# Patient Record
Sex: FEMALE | Race: WHITE | NOT HISPANIC OR LATINO | Employment: UNEMPLOYED | URBAN - METROPOLITAN AREA
[De-identification: names, ages, dates, MRNs, and addresses within clinical notes are randomized per-mention and may not be internally consistent; named-entity substitution may affect disease eponyms.]

---

## 2019-02-12 ENCOUNTER — OFFICE VISIT (OUTPATIENT)
Dept: GASTROENTEROLOGY | Facility: CLINIC | Age: 34
End: 2019-02-12

## 2019-02-12 VITALS
HEIGHT: 63 IN | WEIGHT: 188 LBS | SYSTOLIC BLOOD PRESSURE: 118 MMHG | DIASTOLIC BLOOD PRESSURE: 88 MMHG | TEMPERATURE: 97.8 F | BODY MASS INDEX: 33.31 KG/M2

## 2019-02-12 DIAGNOSIS — K58.0 IRRITABLE BOWEL SYNDROME WITH DIARRHEA: Primary | ICD-10-CM

## 2019-02-12 DIAGNOSIS — K21.9 GASTROESOPHAGEAL REFLUX DISEASE WITHOUT ESOPHAGITIS: ICD-10-CM

## 2019-02-12 PROCEDURE — 99203 OFFICE O/P NEW LOW 30 MIN: CPT | Performed by: INTERNAL MEDICINE

## 2019-02-12 RX ORDER — COLESEVELAM 180 1/1
625 TABLET ORAL 2 TIMES DAILY WITH MEALS
Qty: 180 TABLET | Refills: 3 | Status: SHIPPED | OUTPATIENT
Start: 2019-02-12 | End: 2020-04-16 | Stop reason: SDUPTHER

## 2019-02-12 RX ORDER — SERTRALINE HYDROCHLORIDE 100 MG/1
100 TABLET, FILM COATED ORAL DAILY
COMMUNITY
Start: 2019-01-28 | End: 2020-01-28

## 2019-02-12 RX ORDER — CETIRIZINE HYDROCHLORIDE 10 MG/1
10 TABLET ORAL DAILY
COMMUNITY

## 2019-02-12 RX ORDER — LANSOPRAZOLE 30 MG/1
30 CAPSULE, DELAYED RELEASE ORAL DAILY
Qty: 90 CAPSULE | Refills: 3 | Status: SHIPPED | OUTPATIENT
Start: 2019-02-12 | End: 2020-01-22

## 2019-02-12 RX ORDER — LANSOPRAZOLE 30 MG/1
30 CAPSULE, DELAYED RELEASE ORAL DAILY
COMMUNITY
End: 2019-02-12 | Stop reason: SDUPTHER

## 2019-02-12 RX ORDER — METOPROLOL SUCCINATE 50 MG/1
50 TABLET, EXTENDED RELEASE ORAL DAILY
Refills: 3 | COMMUNITY
Start: 2019-01-28

## 2019-02-12 NOTE — PROGRESS NOTES
Chief Complaint   Patient presents with   • Heartburn   • Irritable Bowel Syndrome       Brittany NEWTON is a  33 y.o. female here for a follow up visit for IBS with diarrhea.    HPI     Patient 33-year-old female with history of heartburn and IBS D here for follow-up.  Patient last seen several years ago was doing well on nortriptyline and WelChol as well as Prevacid for her GERD.  Patient reports during pregnancy year and a half ago her GERD symptoms maintain good response on the Prevacid but her IBS symptoms seemed to resolve as she had to come off the nortriptyline during the pregnancy she actually stopped her WelChol with no significant symptoms.  Patient did well for some time after pregnancy but slowly her symptoms have resumed and isn't currently having cramps and diarrhea regularly.  Patient is still breast-feeding so was limited in taking her nortriptyline.  Patient here for further recommendations.    Past Medical History:   Diagnosis Date   • GERD (gastroesophageal reflux disease)    • Hiatal hernia    • Irritable bowel syndrome          Current Outpatient Medications:   •  cetirizine (zyrTEC) 10 MG tablet, Take 10 mg by mouth Daily., Disp: , Rfl:   •  lansoprazole (PREVACID) 30 MG capsule, Take 30 mg by mouth Daily., Disp: , Rfl:   •  metoprolol succinate XL (TOPROL-XL) 50 MG 24 hr tablet, Take 50 mg by mouth Daily., Disp: , Rfl: 3  •  sertraline (ZOLOFT) 100 MG tablet, Take 100 mg by mouth Daily., Disp: , Rfl:   •  colesevelam (WELCHOL) 625 MG tablet, Take 1 tablet by mouth 2 (Two) Times a Day With Meals., Disp: 180 tablet, Rfl: 3    Allergies   Allergen Reactions   • Penicillins Rash       Social History     Socioeconomic History   • Marital status:      Spouse name: Not on file   • Number of children: Not on file   • Years of education: Not on file   • Highest education level: Not on file   Social Needs   • Financial resource strain: Not on file   • Food insecurity - worry: Not on file   •  Food insecurity - inability: Not on file   • Transportation needs - medical: Not on file   • Transportation needs - non-medical: Not on file   Occupational History   • Not on file   Tobacco Use   • Smoking status: Never Smoker   • Smokeless tobacco: Never Used   Substance and Sexual Activity   • Alcohol use: No     Frequency: Never   • Drug use: No   • Sexual activity: Not on file   Other Topics Concern   • Not on file   Social History Narrative   • Not on file       History reviewed. No pertinent family history.    Review of Systems   Constitutional: Negative.    HENT: Negative.    Eyes: Negative.    Respiratory: Negative.    Cardiovascular: Negative.    Gastrointestinal: Positive for abdominal pain and diarrhea. Negative for abdominal distention, anal bleeding, blood in stool, constipation, nausea, rectal pain and vomiting.   Endocrine: Negative.    Musculoskeletal: Negative.    Skin: Negative.    Allergic/Immunologic: Positive for environmental allergies. Negative for food allergies and immunocompromised state.   Hematological: Negative.        Vitals:    02/12/19 1654   BP: 118/88   Temp: 97.8 °F (36.6 °C)       Physical Exam   Constitutional: She is oriented to person, place, and time. She appears well-developed and well-nourished.   HENT:   Head: Normocephalic and atraumatic.   Eyes: Pupils are equal, round, and reactive to light.   Neck: Normal range of motion.   Cardiovascular: Normal rate, regular rhythm and normal heart sounds.   Pulmonary/Chest: Effort normal and breath sounds normal.   Abdominal: Soft. Bowel sounds are normal. She exhibits no shifting dullness, no distension, no pulsatile liver, no fluid wave, no abdominal bruit, no ascites, no pulsatile midline mass and no mass. There is no hepatosplenomegaly. There is no tenderness. There is no rigidity and no guarding. No hernia.   Musculoskeletal: Normal range of motion. She exhibits no edema.   Lymphadenopathy:     She has no cervical adenopathy.    Neurological: She is alert and oriented to person, place, and time. No cranial nerve deficit.   Skin: Skin is warm and dry. No rash noted.   Psychiatric: She has a normal mood and affect. Her behavior is normal.   Nursing note and vitals reviewed.      No visits with results within 2 Month(s) from this visit.   Latest known visit with results is:   Admission on 01/29/2015, Discharged on 01/29/2015   Component Date Value Ref Range Status   • WBC 01/29/2015 14.83* 4.50 - 10.70 K/Cumm Final   • RBC 01/29/2015 4.57  3.90 - 5.20 Million Final   • Hemoglobin 01/29/2015 13.4  11.9 - 15.5 g/dL Final   • Hematocrit 01/29/2015 41.7  35.6 - 45.5 % Final   • MCV 01/29/2015 91.2  80.5 - 98.2 fL Final   • MCH 01/29/2015 29.3  26.9 - 32.0 pg Final   • MCHC 01/29/2015 32.1* 32.4 - 36.3 g/dL Final   • RDW 01/29/2015 12.8  11.7 - 13.0 % Final   • Platelets 01/29/2015 301  140 - 500 K/Cumm Final   • Neutrophil Rel % 01/29/2015 61.3  42.7 - 76.0 % Final   • Lymphocyte Rel % 01/29/2015 30.3  19.6 - 45.3 % Final   • Monocyte Rel % 01/29/2015 5.9  5.0 - 12.0 % Final   • Eosinophil Rel % 01/29/2015 1.1  0.3 - 6.2 % Final   • Basophil Rel % 01/29/2015 0.1  0.0 - 1.5 % Final   • Immature Granulocyte Rel % 01/29/2015 1.3* 0.0 - 0.6 % Final   • Neutrophils Absolute 01/29/2015 9.1* 1.9 - 8.1 K/Cumm Final   • Lymphocytes Absolute 01/29/2015 4.5  0.9 - 4.8 K/Cumm Final   • Monocytes Absolute 01/29/2015 0.9  0.2 - 1.2 K/Cumm Final   • Eosinophils Absolute 01/29/2015 0.2  0.0 - 0.7 K/Cumm Final   • Basophils Absolute 01/29/2015 0.0  0.0 - 0.2 K/Cumm Final   • Glucose 01/29/2015 101* 65 - 99 mg/dL Final   • BUN 01/29/2015 8  6 - 20 mg/dL Final   • Creatinine 01/29/2015 0.55* 0.57 - 1.00 mg/dL Final   • Sodium 01/29/2015 135* 136 - 145 mmol/L Final   • Potassium 01/29/2015 3.4* 3.5 - 5.2 mmol/L Final   • Chloride 01/29/2015 98  98 - 107 mmol/L Final   • CO2 01/29/2015 28  22 - 29 mmol/L Final   • Calcium 01/29/2015 9.3  8.6 - 10.5 mg/dL Final   •  Total Protein 01/29/2015 6.5  6.0 - 8.5 g/dL Final   • Albumin 01/29/2015 4.0  3.5 - 5.2 g/dL Final   • AST (SGOT) 01/29/2015 11  5 - 32 U/L Final   • ALT (SGPT) 01/29/2015 14  5 - 33 U/L Final   • Alkaline Phosphatase 01/29/2015 64  39 - 117 U/L Final   • Total Bilirubin 01/29/2015 0.2  0.1 - 1.2 mg/dL Final   • eGFR 01/29/2015 >60  ml/min/1.732 Final   • HCG Qualitative 01/29/2015 Negative   Final       Brittany was seen today for heartburn and irritable bowel syndrome.    Diagnoses and all orders for this visit:    Irritable bowel syndrome with diarrhea    Other orders  -     colesevelam (WELCHOL) 625 MG tablet; Take 1 tablet by mouth 2 (Two) Times a Day With Meals.      Patient 33-year-old female history of IBS with diarrhea lasting several years ago was doing well on nortriptyline and WelChol.  Patient however had pregnancy in between and stopped her nortriptyline and actually stopped the WelChol as during pregnancy her bowels reverted to normal.  Post-pregnancy however her diarrhea has slowly recurred but as patient is still breast-feeding she cannot take the nortriptyline and is here for further recommendations.  Patient did very well with the addition of the WelChol to her regimen and has its non-absorbed is a good reasonable place to start.  We will begin with WelChol 1 tablet twice a day and adjust for symptom response.

## 2019-02-13 ENCOUNTER — PRIOR AUTHORIZATION (OUTPATIENT)
Dept: GASTROENTEROLOGY | Facility: CLINIC | Age: 34
End: 2019-02-13

## 2019-02-19 ENCOUNTER — PRIOR AUTHORIZATION (OUTPATIENT)
Dept: GASTROENTEROLOGY | Facility: CLINIC | Age: 34
End: 2019-02-19

## 2020-01-22 RX ORDER — LANSOPRAZOLE 30 MG/1
CAPSULE, DELAYED RELEASE ORAL
Qty: 30 CAPSULE | Refills: 0 | Status: SHIPPED | OUTPATIENT
Start: 2020-01-22 | End: 2020-02-24

## 2020-02-24 RX ORDER — LANSOPRAZOLE 30 MG/1
30 CAPSULE, DELAYED RELEASE ORAL DAILY
Qty: 30 CAPSULE | Refills: 0 | Status: SHIPPED | OUTPATIENT
Start: 2020-02-24

## 2020-04-16 ENCOUNTER — TELEMEDICINE (OUTPATIENT)
Dept: GASTROENTEROLOGY | Facility: CLINIC | Age: 35
End: 2020-04-16

## 2020-04-16 VITALS — HEIGHT: 62 IN | BODY MASS INDEX: 34.96 KG/M2 | WEIGHT: 190 LBS

## 2020-04-16 DIAGNOSIS — K58.0 IRRITABLE BOWEL SYNDROME WITH DIARRHEA: Primary | ICD-10-CM

## 2020-04-16 DIAGNOSIS — K21.9 GASTROESOPHAGEAL REFLUX DISEASE WITHOUT ESOPHAGITIS: ICD-10-CM

## 2020-04-16 PROCEDURE — 99213 OFFICE O/P EST LOW 20 MIN: CPT | Performed by: INTERNAL MEDICINE

## 2020-04-16 RX ORDER — COLESEVELAM 180 1/1
1250 TABLET ORAL 2 TIMES DAILY WITH MEALS
Qty: 360 TABLET | Refills: 3 | Status: SHIPPED | OUTPATIENT
Start: 2020-04-16 | End: 2020-06-11 | Stop reason: SDUPTHER

## 2020-04-16 RX ORDER — CYCLOBENZAPRINE HCL 5 MG
5 TABLET ORAL AS NEEDED
COMMUNITY
Start: 2020-04-07

## 2020-04-16 NOTE — PROGRESS NOTES
Chief Complaint   Patient presents with   • Follow-up   • Med Refill       Brittany NEWTON is a  34 y.o. female here for a follow up visit for IBS D and GERD.    HPI   After informed consent obtained patient agreed for FaceTime video visit.  Patient reports currently doing well on Prevacid daily and WelChol, though she occasionally needs to take up to 2 to 4 tablets daily pending symptoms.  With this regimen however her IBS D is under good control as well as her GERD.  Patient here for refill and adjusting dose.    Past Medical History:   Diagnosis Date   • GERD (gastroesophageal reflux disease)    • Hiatal hernia    • Hypertension 10 years ago   • Irritable bowel syndrome          Current Outpatient Medications:   •  cetirizine (zyrTEC) 10 MG tablet, Take 10 mg by mouth Daily., Disp: , Rfl:   •  colesevelam (Welchol) 625 MG tablet, Take 2 tablets by mouth 2 (Two) Times a Day With Meals., Disp: 360 tablet, Rfl: 3  •  cyclobenzaprine (FLEXERIL) 5 MG tablet, Take 5 mg by mouth As Needed., Disp: , Rfl:   •  lansoprazole (PREVACID) 30 MG capsule, Take 1 capsule by mouth Daily. **Please make an appointment for further refills. Thank you., Disp: 30 capsule, Rfl: 0  •  metoprolol succinate XL (TOPROL-XL) 50 MG 24 hr tablet, Take 50 mg by mouth Daily., Disp: , Rfl: 3    Allergies   Allergen Reactions   • Penicillins Rash       Social History     Socioeconomic History   • Marital status:      Spouse name: Not on file   • Number of children: Not on file   • Years of education: Not on file   • Highest education level: Not on file   Tobacco Use   • Smoking status: Never Smoker   • Smokeless tobacco: Never Used   Substance and Sexual Activity   • Alcohol use: No     Frequency: Never   • Drug use: No       History reviewed. No pertinent family history.    Review of Systems   Constitutional: Negative.    Respiratory: Negative.    Cardiovascular: Negative.    Gastrointestinal: Negative.    Musculoskeletal: Negative.    Skin:  Negative.    Hematological: Negative.        There were no vitals filed for this visit.    Physical Exam   Constitutional: She is oriented to person, place, and time. She appears well-developed and well-nourished.   HENT:   Head: Normocephalic and atraumatic.   Eyes: Pupils are equal, round, and reactive to light. No scleral icterus.   Pulmonary/Chest: Effort normal. No respiratory distress.   Neurological: She is alert and oriented to person, place, and time.   Psychiatric: She has a normal mood and affect. Her behavior is normal.       No visits with results within 2 Month(s) from this visit.   Latest known visit with results is:   No results found for any previous visit.       Brittany was seen today for follow-up and med refill.    Diagnoses and all orders for this visit:    Irritable bowel syndrome with diarrhea    Gastroesophageal reflux disease without esophagitis    Other orders  -     colesevelam (Welchol) 625 MG tablet; Take 2 tablets by mouth 2 (Two) Times a Day With Meals.    Patient with IBS D and GERD doing well with GERD on Prevacid.  Patient had discussion with primary care about concerning long-term use of PPIs and calcium absorption.  Discussed with patient as she is previously tried to come off of PPIs using H2 blocker taper and has been unsuccessful will not likely be successful in the future.  Recommend monitoring bone density and if calcium loss begins will need to add acidic boost with calcium supplementation either chewable vitamin C in the form of asorbic acid or citrus juice while taking the calcium.    Patient was taking twice daily WelChol though has occasion to have breakthrough with her diarrhea and is used up to 4 tablets daily will adjust dosage and get prior off for this medication as success has been noted.  Patient to follow-up as needed.

## 2020-06-11 ENCOUNTER — TELEPHONE (OUTPATIENT)
Dept: GASTROENTEROLOGY | Facility: CLINIC | Age: 35
End: 2020-06-11

## 2020-06-11 RX ORDER — COLESEVELAM 180 1/1
1250 TABLET ORAL 2 TIMES DAILY WITH MEALS
Qty: 360 TABLET | Refills: 3 | Status: SHIPPED | OUTPATIENT
Start: 2020-06-11 | End: 2020-07-22 | Stop reason: SDUPTHER

## 2020-06-11 NOTE — TELEPHONE ENCOUNTER
----- Message from Geovani Kamara Rep sent at 6/11/2020  3:20 PM EDT -----  Regarding: New Insurance/Medication Request   Contact: [phone]  Patient states her prescription for colesevelam (Welchol) was never approved by insurance and she needs an alternative medication called in.     Pharmacy:  [facility]/pharmacy #[id number]  [address]  Phone: [phone]   Fax: [phone]     New Insurance Information:  [employer]  Member ID: [id number]  Group Number: [id number]

## 2020-07-21 ENCOUNTER — TELEPHONE (OUTPATIENT)
Dept: GASTROENTEROLOGY | Facility: CLINIC | Age: 35
End: 2020-07-21

## 2020-07-21 NOTE — TELEPHONE ENCOUNTER
Per Yuli, telephone , patient call back.  Returned call, no answer, left message to return phone call.

## 2020-07-21 NOTE — TELEPHONE ENCOUNTER
----- Message from Cathy Bird sent at 7/21/2020  1:25 PM EDT -----  Regarding: vanna  Contact: [phone]  insurance will not cover medication anymore and asking for something different.. Pt is breast feeding

## 2020-07-22 RX ORDER — COLESEVELAM 180 1/1
1250 TABLET ORAL 2 TIMES DAILY WITH MEALS
Qty: 180 TABLET | Refills: 3 | Status: SHIPPED | OUTPATIENT
Start: 2020-07-22

## 2020-07-22 NOTE — TELEPHONE ENCOUNTER
Patient called. She states she would like her prescription for Colesevelam to go to [facility] pharmacy so she can use her [employer] card.   Medication e-scribed.

## 2021-04-16 ENCOUNTER — BULK ORDERING (OUTPATIENT)
Dept: CASE MANAGEMENT | Facility: OTHER | Age: 36
End: 2021-04-16

## 2021-04-16 DIAGNOSIS — Z23 IMMUNIZATION DUE: ICD-10-CM

## 2023-03-13 ENCOUNTER — APPOINTMENT (OUTPATIENT)
Dept: GENERAL RADIOLOGY | Facility: HOSPITAL | Age: 38
End: 2023-03-13
Payer: COMMERCIAL

## 2023-03-13 VITALS
SYSTOLIC BLOOD PRESSURE: 157 MMHG | RESPIRATION RATE: 18 BRPM | HEIGHT: 63 IN | OXYGEN SATURATION: 98 % | BODY MASS INDEX: 33.66 KG/M2 | TEMPERATURE: 98.4 F | HEART RATE: 86 BPM | DIASTOLIC BLOOD PRESSURE: 109 MMHG

## 2023-03-13 PROCEDURE — 99283 EMERGENCY DEPT VISIT LOW MDM: CPT

## 2023-03-13 PROCEDURE — 73562 X-RAY EXAM OF KNEE 3: CPT

## 2023-03-14 ENCOUNTER — HOSPITAL ENCOUNTER (EMERGENCY)
Facility: HOSPITAL | Age: 38
Discharge: HOME | End: 2023-03-14
Attending: EMERGENCY MEDICINE | Admitting: EMERGENCY MEDICINE
Payer: COMMERCIAL

## 2023-03-14 DIAGNOSIS — S83.91XA SPRAIN OF RIGHT KNEE, UNSPECIFIED LIGAMENT, INITIAL ENCOUNTER: Primary | ICD-10-CM

## 2023-03-14 RX ORDER — HYDROCODONE BITARTRATE AND ACETAMINOPHEN 7.5; 325 MG/1; MG/1
1 TABLET ORAL ONCE
Status: COMPLETED | OUTPATIENT
Start: 2023-03-14 | End: 2023-03-14

## 2023-03-14 RX ADMIN — HYDROCODONE BITARTRATE AND ACETAMINOPHEN 1 TABLET: 7.5; 325 TABLET ORAL at 02:32

## 2023-03-14 NOTE — ED TRIAGE NOTES
"Patient to ED per PV after mechanical fall. Patient states she \"felt a pop\" in her R knee when she fell; states she is unable to bear weight on leg. Patient denies numbness/tingling in R leg; states she is able to move foot and toes (w/ increased pain).     Patient states that she has not taken her PM BP med.    Patient and staff w/ appropriate PPE in place throughout encounter.  "

## 2023-03-14 NOTE — ED PROVIDER NOTES
EMERGENCY DEPARTMENT ENCOUNTER    Room Number:  15/15  Date seen:  3/14/2023  PCP: Michele Olivera MD  Historian: Patient  Relevant information provided by sources other than the patient, review of external records, and social determinants of health may be included in the HPI section.      HPI:  Chief Complaint: Right knee injury  Additional historical features obtained directly from: Nothing  Context: Brittany NEWTON is a 37 y.o. female who presents to the ED c/o right knee injury that occurred just prior to arrival at home.  Patient went awkwardly down the stairs and said that the lower part of her leg went 1 way and the upper part went the other.  She had severe pain and swelling of the right knee, mostly localized on the medial aspect.  She was unable to bear weight due to pain.  Otherwise without complaints no other injury        Initial impression including social determinants which will impact the assessment      Initial impression is most consistent with a knee sprain and possible internal derangement.  We will get plain films to look for any major structural damage and this will likely require orthopedic follow-up          PAST MEDICAL HISTORY  Active Ambulatory Problems     Diagnosis Date Noted   • Irritable bowel syndrome with diarrhea 02/12/2019   • Gastroesophageal reflux disease without esophagitis 02/12/2019     Resolved Ambulatory Problems     Diagnosis Date Noted   • No Resolved Ambulatory Problems     Past Medical History:   Diagnosis Date   • GERD (gastroesophageal reflux disease)    • Hiatal hernia    • Hypertension 10 years ago   • Irritable bowel syndrome          PAST SURGICAL HISTORY  Past Surgical History:   Procedure Laterality Date   • COLONOSCOPY  approx 2008    pt does not recall results    • TONSILLECTOMY AND ADENOIDECTOMY     • UPPER GASTROINTESTINAL ENDOSCOPY  approx 2008    patient does not recall results    • UVULECTOMY     • WISDOM TOOTH EXTRACTION           FAMILY  "HISTORY  No family history on file.      SOCIAL HISTORY  Social History     Socioeconomic History   • Marital status:    Tobacco Use   • Smoking status: Never   • Smokeless tobacco: Never   Substance and Sexual Activity   • Alcohol use: No   • Drug use: No         ALLERGIES  Penicillins          PHYSICAL EXAM  ED Triage Vitals   Temp Heart Rate Resp BP SpO2   03/13/23 2308 03/13/23 2308 03/13/23 2308 03/13/23 2314 03/13/23 2308   98.4 °F (36.9 °C) 86 18 (!) 157/109 98 %      Temp src Heart Rate Source Patient Position BP Location FiO2 (%)   03/13/23 2308 03/13/23 2308 03/13/23 2314 03/13/23 2314 --   Oral Monitor Sitting Right arm          Physical Exam      GENERAL: Awake and alert, mild distress due to pain  HENT: nares patent  EYES: no scleral icterus, PERRL, EOMI  CV: regular rhythm, normal rate, distal pulses symmetric and palpable  RESPIRATORY: normal effort  ABDOMEN: soft, nondistended nontender with normal bowel sound  MUSCULOSKELETAL: no deformity.  There is soft tissue tenderness and swelling about the right knee, particularly along the medial joint line.  There is a small effusion and limited range of motion due to pain and effusion but not unstable.  The flexor and extensor mechanisms appear to be intact and the lower leg is neurovascular intact distally.  Compartments are all soft  NEURO: alert, moves all extremities, follows commands  PSYCH:  calm, cooperative  SKIN: warm, dry with no rash        LAB RESULTS  No results found for this or any previous visit (from the past 24 hour(s)).    Ordered the above labs and my independent interpretation of these results are discussed in the section labeled \"ED course\" below        RADIOLOGY  XR Knee 3 View Right    Result Date: 3/14/2023  EXAMINATION: 3 VIEWS OF THE RIGHT KNEE  HISTORY: 37-year-old female with a history of right knee pain after a fall.  FINDINGS: AP, patellar sunrise and lateral radiographs of the right knee were obtained and demonstrate " "normal alignment of the osseous structures of the right knee without evidence for a bony or soft tissue abnormality. There is no evidence for joint effusion.      Negative right knee radiographs.  This report was finalized on 3/14/2023 12:04 AM by Dr. Mynor Velasco M.D.        Ordered the above noted radiological studies.  Independently interpreted by me in PACS.  My independent interpretation of these results are discussed in the section below labeled \"ED course\"        PROCEDURES  Procedures          MEDICATIONS GIVEN IN ER  Medications   HYDROcodone-acetaminophen (NORCO) 7.5-325 MG per tablet 1 tablet (1 tablet Oral Given 3/14/23 0232)                   MEDICAL DECISION MAKING and INDEPENDENT INTERPRETATIONS      Everything documented below this statement is the \"ED course\" section which I have referred to above.  Everything discussed in the following section constitutes MY INDEPENDENT INTERPRETATIONS of the lab work, EKGs, and radiology studies, and all of my personal conversations with other providers, and all of my independent decision making regarding this patient are discussed below.      ED Course as of 03/14/23 0343   Tue Mar 14, 2023   0342 Plain film of the right knee shows no acute fracture or subluxation [DP]   0342 Exam is somewhat limited because of the discomfort that she is in, but I do suspect there is an internal derangement.  Its not unstable and I do not suspect significant ligament damage, but I would predict meniscal injury.  I have placed her in a knee immobilizer and will have her follow-up with orthopedic [DP]   0342 I did consider prescription pain management, however I believe that over-the-counter ibuprofen and Motrin along with immobility and icing will be more effective [DP]   0342 She did receive a hydrocodone prior to discharge and we provided her with crutches if she needs [DP]      ED Course User Index  [DP] Aleks Pablo MD         Everything documented above with this " "statement, in the ED course section constitutes my independent interpretation of lab work EKG and radiology studies along with my personal conversations with other providers and my independent medical decision making.  This statement will not be repeated before each data point, but they are all my independent interpretation needs contained within the \"ED course\" section.             All appropriate hygiene and PPE requirements were satisfied with this patient encounter      FINAL DIAGNOSES  Final diagnoses:   Sprain of right knee, unspecified ligament, initial encounter           DISPOSITION  Discharge          Latest Documented Vital Signs:  As of 03:43 EDT  BP- (!) 157/109 HR- 86 Temp- 98.4 °F (36.9 °C) (Oral) O2 sat- 98%        --    Please note that portions of this were completed with a voice recognition program.       Note Disclaimer: At [facility], we believe that sharing information builds trust and better relationships. You are receiving this note because you are receiving care at [facility] or recently visited. It is possible you will see health information before a provider has talked with you about it. This kind of information can be easy to misunderstand. To help you fully understand what it      Aleks Pablo MD  03/14/23 0343    "

## 2024-02-26 ENCOUNTER — OFFICE VISIT (OUTPATIENT)
Dept: FAMILY MEDICINE CLINIC | Facility: CLINIC | Age: 39
End: 2024-02-26
Payer: COMMERCIAL

## 2024-02-26 VITALS
WEIGHT: 208 LBS | SYSTOLIC BLOOD PRESSURE: 132 MMHG | OXYGEN SATURATION: 97 % | BODY MASS INDEX: 36.86 KG/M2 | HEIGHT: 63 IN | HEART RATE: 84 BPM | DIASTOLIC BLOOD PRESSURE: 88 MMHG

## 2024-02-26 DIAGNOSIS — F33.0 MILD EPISODE OF RECURRENT MAJOR DEPRESSIVE DISORDER: ICD-10-CM

## 2024-02-26 DIAGNOSIS — Z23 ENCOUNTER FOR IMMUNIZATION: ICD-10-CM

## 2024-02-26 DIAGNOSIS — R01.1 HEART MURMUR: ICD-10-CM

## 2024-02-26 DIAGNOSIS — Z00.00 ANNUAL PHYSICAL EXAM: ICD-10-CM

## 2024-02-26 DIAGNOSIS — Z82.3 FAMILY HISTORY OF STROKE DUE TO ANEURYSM: ICD-10-CM

## 2024-02-26 DIAGNOSIS — I10 PRIMARY HYPERTENSION: Primary | ICD-10-CM

## 2024-02-26 DIAGNOSIS — Z11.59 NEED FOR HEPATITIS C SCREENING TEST: ICD-10-CM

## 2024-02-26 DIAGNOSIS — Z83.2 FAMILY HISTORY OF BLEEDING OR CLOTTING DISORDER: ICD-10-CM

## 2024-02-26 PROBLEM — G43.009 MIGRAINE HEADACHE WITHOUT AURA: Status: ACTIVE | Noted: 2024-02-26

## 2024-02-26 PROBLEM — E66.811 CLASS 1 OBESITY WITH BODY MASS INDEX (BMI) OF 34.0 TO 34.9 IN ADULT: Status: RESOLVED | Noted: 2019-09-05 | Resolved: 2024-02-26

## 2024-02-26 PROBLEM — Z87.59 HISTORY OF PRE-ECLAMPSIA: Status: ACTIVE | Noted: 2024-02-26

## 2024-02-26 PROBLEM — E66.9 CLASS 1 OBESITY WITH BODY MASS INDEX (BMI) OF 34.0 TO 34.9 IN ADULT: Status: RESOLVED | Noted: 2019-09-05 | Resolved: 2024-02-26

## 2024-02-26 PROBLEM — F32.A DEPRESSION: Status: ACTIVE | Noted: 2020-07-29

## 2024-02-26 PROBLEM — Z34.90 NORMAL PREGNANCY: Status: RESOLVED | Noted: 2020-09-28 | Resolved: 2024-02-26

## 2024-02-26 PROBLEM — K58.9 IBS (IRRITABLE BOWEL SYNDROME): Status: ACTIVE | Noted: 2019-02-12

## 2024-02-26 PROBLEM — F41.1 GAD (GENERALIZED ANXIETY DISORDER): Status: ACTIVE | Noted: 2019-09-05

## 2024-02-26 PROBLEM — Z76.89 INITIAL PATIENT ENCOUNTER: Status: RESOLVED | Noted: 2020-08-31 | Resolved: 2024-02-26

## 2024-02-26 LAB
ACYLCARNITINE/C0 UR-RTO: NORMAL {RATIO}
EXPIRATION DATE: NORMAL
Lab: NORMAL
POC CREATININE URINE: NORMAL
POC MICROALBUMIN URINE: NORMAL

## 2024-02-26 RX ORDER — DESVENLAFAXINE SUCCINATE 50 MG/1
50 TABLET, EXTENDED RELEASE ORAL DAILY
Qty: 90 TABLET | Refills: 3 | Status: SHIPPED | OUTPATIENT
Start: 2024-02-26 | End: 2025-02-25

## 2024-02-26 RX ORDER — DESVENLAFAXINE SUCCINATE 50 MG/1
50 TABLET, EXTENDED RELEASE ORAL DAILY
COMMUNITY
Start: 2023-09-01 | End: 2024-02-26 | Stop reason: SDUPTHER

## 2024-02-26 RX ORDER — VALSARTAN 80 MG/1
80 TABLET ORAL DAILY
Qty: 90 TABLET | Refills: 3 | Status: SHIPPED | OUTPATIENT
Start: 2024-02-26

## 2024-02-26 RX ORDER — LABETALOL 200 MG/1
TABLET, FILM COATED ORAL
COMMUNITY
Start: 2021-03-03 | End: 2024-02-26 | Stop reason: ALTCHOICE

## 2024-02-26 NOTE — PATIENT INSTRUCTIONS
Today: Update screening labs and vaccinations.    Meds: STOP labetalol  START valsartan 80mg daily  Refill pristiq    Imaging: Echocardiogram, check murmur, you should receive a call within 1 week to schedule the appointment.  If you do not hear anything please let us know.    Healthy lifestyle tips  - Reduce intake of processed food (shop perimeter of grocery store), especially white flour and sugar  - Increase intake of fruits/veggies  - Drink 32-64oz of water a day  - Quit smoking if you smoke  - Drink no more than 2 alcoholic beverages/day if you are male, no more than 1 alcoholic beverage/day if you are female  - Get 6-8 hours of restful sleep at night- poor sleep quality has been linked to increased risk of cardiovascular disease  - Voluntary physical activity cumulative 120-150 minutes/week  - Stress management utilizing meditation and mindfulness (InsightTimer, free diana)  - Keep blood pressure < 140/90    Heart healthy diet from AHA: https://www.heart.org/en/healthy-living/healthy-eating/eat-smart/nutrition-basics/aha-diet-and-lifestyle-recommendations    HTN plan  - The symptoms of organ damage from hypertension are very subtle until there is significant damage, so prevention is key through lifestyle modifications: reduce salt intake to less than 1500mg daily (about a teaspoon), , increase intake of unprocessed fresh foods, avoid processed sugar and flour, work up to 120 minutes of exercise weekly as tolerated. Consider trying the DASH diet, or Mediterranean diet  - Goal BP: <140/90  - go to the optometrist every year to monitor for retinopathy  -  Check BP every day with automatic arm cuff. Ensure BP cuff is on bare skin, you are seated with feet flat on ground and legs uncrossed, the arm is at the level of the heart, and you are not talking during the BP measurement. Keep a log of your BP and bring it to your next appointment.     Check out https://www.heart.org/en/health-topics/high-blood-pressure    MH  plan:  - Start counseling if not currently established  - Meditation: check out Insight Timer (free diana)  - Sleep hygiene  - Increase physical activity as tolerated- goal 120mins/week  - Acupressure  - Supplements: magnesium, ashwaganda  - May make appt for Dr. Newton's acupuncture clinic if desired- Tuesdays, cash pay  - If you experience any thoughts of harming yourself or someone else, please go to the ER immediately.     Resources:  https://www.apa.org/topics/anxiety/    https://www.apa.org/topics/depression/    https://sleepeducation.org/healthy-sleep/healthy-sleep-habits/    https://[email or web address] [place] [place].org/cancer-care/patient-education/acupressure-[email or web address]-anxiety

## 2024-02-26 NOTE — PROGRESS NOTES
"Chief Complaint  Chief Complaint   Patient presents with    Establish Care       Subjective    History of Present Illness  Brittany Russo is a 38 y.o. female presents to [facility] to establish care.  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 She downloaded a free diaan called [employer] with free workouts.   Support system: , great family, parents, siblings  Sleep: \"Could be better\"- cosleeps with both kids right now because  snores so it's nice to be in another room  Mood: Good if she doesn't miss her pristiq. Previously on generic zoloft for awhile, after 5 years started to not work. Wellbutrin did not work.   Health goals: Would like to optimize fitness and health choices.   She was diagnosed with gestational hypertension with her first pregnancy, she had high BP after delivery of her son controlled with med. BP significantly worsened with second pregnancy, was borderline pre-e. Daughter was born early.  Her sister  a year ago from a CVST, a rare brain aneurysmal stroke. She had a clotting disorder that has factor V Leiden def, and was recommended to be checked for coagulopathy. She has never had an abnormal blood clot, she does have heavy periods.     Current Outpatient Medications on File Prior to Visit   Medication Sig Dispense Refill    [DISCONTINUED] desvenlafaxine (PRISTIQ) 50 MG 24 hr tablet Take 1 tablet by mouth Daily.      [DISCONTINUED] labetalol (NORMODYNE) 200 MG tablet       [DISCONTINUED] cetirizine (zyrTEC) 10 MG tablet Take 10 mg by mouth Daily.      [DISCONTINUED] colesevelam (Welchol) 625 MG tablet Take 2 tablets by mouth 2 (Two) Times a Day With Meals. 180 tablet 3    [DISCONTINUED] cyclobenzaprine (FLEXERIL) 5 MG tablet Take 5 mg by " mouth As Needed.      [DISCONTINUED] lansoprazole (PREVACID) 30 MG capsule Take 1 capsule by mouth Daily. **Please make an appointment for further refills. Thank you. 30 capsule 0    [DISCONTINUED] metoprolol succinate XL (TOPROL-XL) 50 MG 24 hr tablet Take 50 mg by mouth Daily.  3     No current facility-administered medications on file prior to visit.       Patient Active Problem List   Diagnosis    IBS (irritable bowel syndrome)    Gastroesophageal reflux disease    Depression    ANDREA (generalized anxiety disorder)    Heart murmur    Hemorrhoids, external    Hypertension    Migraine headache without aura    History of pre-eclampsia    Family history of stroke due to aneurysm    Family history of bleeding or clotting disorder       Past Medical History:   Diagnosis Date    Allergic     GERD (gastroesophageal reflux disease)     Heart murmur     Hiatal hernia     Hypertension 10 years ago    Initial patient encounter 2020    Irritable bowel syndrome     Normal pregnancy 2020    Scoliosis        Past Surgical History:   Procedure Laterality Date     SECTION  2017 and 3-3-2021    COLONOSCOPY  approx     pt does not recall results     TONSILLECTOMY AND ADENOIDECTOMY      UPPER GASTROINTESTINAL ENDOSCOPY  approx     patient does not recall results     UVULECTOMY      WISDOM TOOTH EXTRACTION         Family History   Problem Relation Age of Onset    Memory loss Mother     Hyperlipidemia Father     Diabetes type II Father     Hypertension Father     Early death Sister     Stroke Sister     Factor V Leiden deficiency Sister     Hypertension Brother     Hypertension Brother     Dementia Maternal Grandmother     Stroke Maternal Grandfather 70    Diabetes type I Nephew     Breast cancer Neg Hx     Colon cancer Neg Hx        Health Maintenance Summary            Ordered - HEPATITIS C SCREENING (Once) Ordered on 2024      No completion, postpone, or frequency change history exists for this  "topic.              ANNUAL PHYSICAL (Yearly) Next due on 2/26/2025 02/26/2024  Done              BMI FOLLOWUP (Yearly) Next due on 2/26/2025 02/26/2024  Postponed until 2/27/2024 by Coty Newton MD (Pending event)    02/26/2024  Smart[employer]: WORKFLOW - QUALITY MEASUREMENT - DOCUMENTED WEIGHT FOLLOW-UP PLAN    02/26/2024  Smart[employer]: WORKFLOW - QUALITY MEASUREMENT - BMI FOLLOW UP CARE PLAN DOCUMENTED              PAP SMEAR (Every 5 Years) Next due on 4/30/2026 02/26/2024  Frequency changed to Every 5 Years by Coty Newton MD    04/30/2021  Patient-Reported (Performed Externally)              TDAP/TD VACCINES (2 - Td or Tdap) Next due on 2/26/2034 02/26/2024  Imm Admin: Tdap              COVID-19 Vaccine (Series Information) Completed      10/24/2023  Imm Admin: COVID-19 F23 (PFIZER) 12YRS+ (COMIRNATY)    10/13/2022  Imm Admin: COVID-19 (PFIZER) BIVALENT 12+YRS    11/08/2021  Imm Admin: COVID-19 (PFIZER) Purple Cap Monovalent    04/10/2021  Imm Admin: COVID-19 (PFIZER) Purple Cap Monovalent    03/20/2021  Imm Admin: COVID-19 (PFIZER) Purple Cap Monovalent    Only the first 5 history entries have been loaded, but more history exists.              INFLUENZA VACCINE  Completed      10/24/2023  Imm Admin: Influenza Injectable Mdck Pf Quad    10/13/2022  Imm Admin: Fluzone (or Fluarix & Flulaval for VFC) >6mos    09/08/2021  Imm Admin: Fluzone (or Fluarix & Flulaval for VFC) >6mos    10/12/2020  Imm Admin: Influenza, Unspecified    10/11/2019  Imm Admin: Fluzone (or Fluarix & Flulaval for VFC) >6mos    Only the first 5 history entries have been loaded, but more history exists.              Pneumococcal Vaccine 0-64 (Series Information) Aged Out      No completion, postpone, or frequency change history exists for this topic.                       Objective   Vitals:    02/26/24 1007   BP: 132/88   Pulse: 84   SpO2: 97%   Weight: 94.3 kg (208 lb)   Height: 160 cm (62.99\")        Class 2 Severe Obesity " (BMI >=35 and <=39.9). Obesity-related health conditions include the following: hypertension. Obesity is improving with lifestyle modifications. BMI is is above average; BMI management plan is completed. We discussed portion control, increasing exercise, and management of depression/anxiety/stress to control compensatory eating.       Physical Exam  Vitals reviewed.   Constitutional:       General: She is not in acute distress.     Appearance: Normal appearance.   HENT:      Head: Normocephalic and atraumatic.      Right Ear: External ear normal. There is no impacted cerumen.      Left Ear: External ear normal. There is no impacted cerumen.      Nose: Nose normal. No rhinorrhea.      Mouth/Throat:      Mouth: Mucous membranes are moist.      Pharynx: No posterior oropharyngeal erythema.   Eyes:      Extraocular Movements: Extraocular movements intact.      Conjunctiva/sclera: Conjunctivae normal.      Pupils: Pupils are equal, round, and reactive to light.   Neck:      Comments: No thyromegaly or thyroid nodule on palpation  Cardiovascular:      Rate and Rhythm: Normal rate and regular rhythm.      Pulses: Normal pulses.      Heart sounds: Normal heart sounds. Murmur heard.   Pulmonary:      Effort: Pulmonary effort is normal.      Breath sounds: Normal breath sounds. No wheezing.   Abdominal:      General: Bowel sounds are normal. There is no distension.      Palpations: Abdomen is soft.      Tenderness: There is no abdominal tenderness.   Musculoskeletal:         General: No tenderness or deformity. Normal range of motion.      Cervical back: Normal range of motion and neck supple.   Lymphadenopathy:      Cervical: No cervical adenopathy.   Skin:     General: Skin is warm and dry.      Capillary Refill: Capillary refill takes less than 2 seconds.      Findings: No lesion or rash.   Neurological:      General: No focal deficit present.      Mental Status: She is alert and oriented to person, place, and time.       Gait: Gait normal.   Psychiatric:         Mood and Affect: Mood normal.         Behavior: Behavior normal.         Judgment: Judgment normal.          PHQ-9 Depression Screening  Little interest or pleasure in doing things? 0-->not at all   Feeling down, depressed, or hopeless? 0-->not at all   Trouble falling or staying asleep, or sleeping too much?     Feeling tired or having little energy?     Poor appetite or overeating?     Feeling bad about yourself - or that you are a failure or have let yourself or your family down?     Trouble concentrating on things, such as reading the newspaper or watching television?     Moving or speaking so slowly that other people could have noticed? Or the opposite - being so fidgety or restless that you have been moving around a lot more than usual?     Thoughts that you would be better off dead, or of hurting yourself in some way?     PHQ-9 Total Score 0   If you checked off any problems, how difficult have these problems made it for you to do your work, take care of things at home, or get along with other people?       The following data was reviewed by: Coty Newton MD on 02/26/2024:  Reviewed 2022 CBC, CMP  Urgent care visit January 2024 for low back pain      Assessment and Plan  Brittany Russo is a 38 y.o. female presents to [facility] today to establish care, chart reviewed and updated, medications reviewed, labs reviewed, preventative med reviewed. Patient has not been seen by primary care for annual exam in the last 12 months.. Answered all questions at this time, pt expressed no further concerns today.     Preventative medicine:   Eye exam: Up to date.    Dental exam: Up to date.    BP: normal  Lipid Panel :   Ordered    A1c:  ordered    Hep C screening: Ordered  HIV Screen UTD - Yes  STI screening UTD: Yes  Colon cancer screening UTD: N/A-age 45  Lung cancer screening UTD: N/A  Immunizations UTD: Yes  Anxiety/depression screening:  normal  Tobacco cessation counseling: N/A    Women:   Pap: Up to date.  normal  Mammogram:  Due age 40    Osteoporosis Screen:  Due age 65      Diagnoses and all orders for this visit:    1. Primary hypertension (Primary)  Assessment & Plan:  With chronic HTN, currently Controlled on current regimen. Patient is compliant with medications.  There is no evidence of end-organ damage on exam.   -DC labetalol; advised patient that beta-blockers are not recommended outside of pregnancy and postpartum as solo therapy for hypertension  -Start losartan 80 mg  - lab workup: CBC, CMP, TSH, lipid, A1c, UA, microalbumin  - pt to make appt with optometry for dilated eye exam, check fundus for evidence of retinopathy  - Pt to measure BP on own several times weekly, at varying times of day, journaling readings to be brought in to appointments for review with physician. Reviewed correct method of home BP measurement. Goal BP < 140/90.   -  Reviewed DASH diet and avoidance of tobacco.   - To RTC in 12 weeks after starting new dose for BP recheck, sooner prn.       Orders:  -     CBC & Differential  -     Comprehensive Metabolic Panel  -     Lipid Panel  -     TSH Rfx On Abnormal To Free T4  -     Hemoglobin A1c  -     POC Microalbumin  -     valsartan (Diovan) 80 MG tablet; Take 1 tablet by mouth Daily. Indications: High Blood Pressure Disorder  Dispense: 90 tablet; Refill: 3    2. Mild episode of recurrent major depressive disorder  Comments:  Well-controlled on current dose, will refill.  AVS with nonpharmacologic strategies.  Orders:  -     desvenlafaxine (PRISTIQ) 50 MG 24 hr tablet; Take 1 tablet by mouth Daily. Indications: Major Depressive Disorder  Dispense: 90 tablet; Refill: 3    3. Heart murmur  Comments:  Patient with heart murmur since birth, has never had echo, will get echo given hypertension, and family history.  Orders:  -     Adult Transthoracic Echo Complete W/ Cont if Necessary Per Protocol; Future    4. Family  history of bleeding or clotting disorder  Comments:  Sister with factor V Leiden disease, death at age 42 from aneurysmal stroke.  Will check coagulopathy labs.  Orders:  -     Von Willebrand panel  -     Equal mix, PT / INR  -     APTT  -     Factor 5 Leiden    5. Family history of stroke due to aneurysm  -     Von Willebrand panel  -     Equal mix, PT / INR  -     APTT  -     Factor 5 Leiden    6. Annual physical exam    7. Encounter for immunization  -     Tdap Vaccine Greater Than or Equal To 8yo IM    8. Need for hepatitis C screening test  -     Hepatitis C Antibody        Patient voiced understanding and agreement with plan of care and had no further questions or concerns at this time.     Coty Newton MD  Family Medicine  [facility]    Follow Up  Return in about 3 months (around 5/26/2024) for Recheck.    Patient Instructions   Today: Update screening labs and vaccinations.    Meds: STOP labetalol  START valsartan 80mg daily  Refill pristiq    Imaging: Echocardiogram, check murmur, you should receive a call within 1 week to schedule the appointment.  If you do not hear anything please let us know.    Healthy lifestyle tips  - Reduce intake of processed food (shop perimeter of grocery store), especially white flour and sugar  - Increase intake of fruits/veggies  - Drink 32-64oz of water a day  - Quit smoking if you smoke  - Drink no more than 2 alcoholic beverages/day if you are male, no more than 1 alcoholic beverage/day if you are female  - Get 6-8 hours of restful sleep at night- poor sleep quality has been linked to increased risk of cardiovascular disease  - Voluntary physical activity cumulative 120-150 minutes/week  - Stress management utilizing meditation and mindfulness (InsightTimer, free diana)  - Keep blood pressure < 140/90    Heart healthy diet from AHA:  https://www.heart.org/en/healthy-living/healthy-eating/eat-smart/nutrition-basics/aha-diet-and-lifestyle-recommendations    HTN plan  - The symptoms of organ damage from hypertension are very subtle until there is significant damage, so prevention is key through lifestyle modifications: reduce salt intake to less than 1500mg daily (about a teaspoon), , increase intake of unprocessed fresh foods, avoid processed sugar and flour, work up to 120 minutes of exercise weekly as tolerated. Consider trying the DASH diet, or Mediterranean diet  - Goal BP: <140/90  - go to the optometrist every year to monitor for retinopathy  -  Check BP every day with automatic arm cuff. Ensure BP cuff is on bare skin, you are seated with feet flat on ground and legs uncrossed, the arm is at the level of the heart, and you are not talking during the BP measurement. Keep a log of your BP and bring it to your next appointment.     Check out https://www.heart.org/en/health-topics/high-blood-pressure    MH plan:  - Start counseling if not currently established  - Meditation: check out Insight Timer (free diana)  - Sleep hygiene  - Increase physical activity as tolerated- goal 120mins/week  - Acupressure  - Supplements: magnesium, ashwaganda  - May make appt for Dr. Newton's acupuncture clinic if desired- Tuesdays, cash pay  - If you experience any thoughts of harming yourself or someone else, please go to the ER immediately.     Resources:  https://www.apa.org/topics/anxiety/    https://www.apa.org/topics/depression/    https://sleepeducation.org/healthy-sleep/healthy-sleep-habits/    https://www.[email or web address]org/cancer-care/patient-education/acupressure-stress-and-anxiety

## 2024-02-26 NOTE — ASSESSMENT & PLAN NOTE
With chronic HTN, currently Controlled on current regimen. Patient is compliant with medications.  There is no evidence of end-organ damage on exam.   -DC labetalol; advised patient that beta-blockers are not recommended outside of pregnancy and postpartum as solo therapy for hypertension  -Start losartan 80 mg  - lab workup: CBC, CMP, TSH, lipid, A1c, UA, microalbumin  - pt to make appt with optometry for dilated eye exam, check fundus for evidence of retinopathy  - Pt to measure BP on own several times weekly, at varying times of day, journaling readings to be brought in to appointments for review with physician. Reviewed correct method of home BP measurement. Goal BP < 140/90.   -  Reviewed DASH diet and avoidance of tobacco.   - To RTC in 12 weeks after starting new dose for BP recheck, sooner prn.

## 2024-02-28 ENCOUNTER — PATIENT ROUNDING (BHMG ONLY) (OUTPATIENT)
Dept: FAMILY MEDICINE CLINIC | Facility: CLINIC | Age: 39
End: 2024-02-28
Payer: COMMERCIAL

## 2024-02-28 NOTE — PROGRESS NOTES
Hi Mrs. Russo,     I'd like to welcome you to our practice. I hope your recent visit to our office went smoothly.  If you have questions or concerns, you may reach me at [phone].     Thank you and have a great day!    Florina  Practice manager  [facility] DEANNA Shaffer.

## 2024-03-07 LAB
ALBUMIN SERPL-MCNC: 4.3 G/DL (ref 3.9–4.9)
ALBUMIN/GLOB SERPL: 1.7 {RATIO} (ref 1.2–2.2)
ALP SERPL-CCNC: 107 IU/L (ref 44–121)
ALT SERPL-CCNC: 17 IU/L (ref 0–32)
APTT PPP: NORMAL SEC
AST SERPL-CCNC: 15 IU/L (ref 0–40)
BASOPHILS # BLD AUTO: 0 X10E3/UL (ref 0–0.2)
BASOPHILS NFR BLD AUTO: 1 %
BILIRUB SERPL-MCNC: 0.2 MG/DL (ref 0–1.2)
BUN SERPL-MCNC: 10 MG/DL (ref 6–20)
BUN/CREAT SERPL: 15 (ref 9–23)
CALCIUM SERPL-MCNC: 9.3 MG/DL (ref 8.7–10.2)
CHLORIDE SERPL-SCNC: 103 MMOL/L (ref 96–106)
CHOLEST SERPL-MCNC: 246 MG/DL (ref 100–199)
CO2 SERPL-SCNC: 24 MMOL/L (ref 20–29)
CREAT SERPL-MCNC: 0.66 MG/DL (ref 0.57–1)
EGFRCR SERPLBLD CKD-EPI 2021: 114 ML/MIN/1.73
EOSINOPHIL # BLD AUTO: 0.1 X10E3/UL (ref 0–0.4)
EOSINOPHIL NFR BLD AUTO: 1 %
ERYTHROCYTE [DISTWIDTH] IN BLOOD BY AUTOMATED COUNT: 13.1 % (ref 11.7–15.4)
F5 P.R506Q BLD/T QL: NORMAL
FACT VIII ACT/NOR PPP: 109 % (ref 56–140)
GLOBULIN SER CALC-MCNC: 2.6 G/DL (ref 1.5–4.5)
GLUCOSE SERPL-MCNC: 93 MG/DL (ref 70–99)
HBA1C MFR BLD: 5.6 % (ref 4.8–5.6)
HCT VFR BLD AUTO: 42.2 % (ref 34–46.6)
HCV IGG SERPL QL IA: NON REACTIVE
HDLC SERPL-MCNC: 49 MG/DL
HGB BLD-MCNC: 13.3 G/DL (ref 11.1–15.9)
IMM GRANULOCYTES # BLD AUTO: 0 X10E3/UL (ref 0–0.1)
IMM GRANULOCYTES NFR BLD AUTO: 0 %
IMP & REVIEW OF LAB RESULTS: NORMAL
LDLC SERPL CALC-MCNC: 147 MG/DL (ref 0–99)
LYMPHOCYTES # BLD AUTO: 1.7 X10E3/UL (ref 0.7–3.1)
LYMPHOCYTES NFR BLD AUTO: 20 %
MCH RBC QN AUTO: 28.3 PG (ref 26.6–33)
MCHC RBC AUTO-ENTMCNC: 31.5 G/DL (ref 31.5–35.7)
MCV RBC AUTO: 90 FL (ref 79–97)
MONOCYTES # BLD AUTO: 0.5 X10E3/UL (ref 0.1–0.9)
MONOCYTES NFR BLD AUTO: 5 %
NEUTROPHILS # BLD AUTO: 6.5 X10E3/UL (ref 1.4–7)
NEUTROPHILS NFR BLD AUTO: 73 %
PATH INTERP BLD-IMP: NORMAL
PLATELET # BLD AUTO: 284 X10E3/UL (ref 150–450)
POTASSIUM SERPL-SCNC: 4.6 MMOL/L (ref 3.5–5.2)
PROT SERPL-MCNC: 6.9 G/DL (ref 6–8.5)
PROTHROMBIN TIME: 10.6 SEC (ref 9.1–12)
PT 1H NP PPP: NORMAL SEC
PT IMM NP PPP: 10.1 SEC (ref 9.1–12)
RBC # BLD AUTO: 4.7 X10E6/UL (ref 3.77–5.28)
SODIUM SERPL-SCNC: 140 MMOL/L (ref 134–144)
SPECIMEN STATUS: NORMAL
TRIGL SERPL-MCNC: 272 MG/DL (ref 0–149)
TSH SERPL DL<=0.005 MIU/L-ACNC: 1.25 UIU/ML (ref 0.45–4.5)
VLDLC SERPL CALC-MCNC: 50 MG/DL (ref 5–40)
VWF AG ACT/NOR PPP IA: 94 % (ref 50–200)
VWF:RCO ACT/NOR PPP PL AGG: 54 % (ref 50–200)
WBC # BLD AUTO: 8.9 X10E3/UL (ref 3.4–10.8)

## 2024-03-29 ENCOUNTER — HOSPITAL ENCOUNTER (OUTPATIENT)
Dept: CARDIOLOGY | Facility: HOSPITAL | Age: 39
Discharge: HOME | End: 2024-03-29
Payer: COMMERCIAL

## 2024-03-29 VITALS
WEIGHT: 208 LBS | HEART RATE: 84 BPM | HEIGHT: 62 IN | SYSTOLIC BLOOD PRESSURE: 153 MMHG | BODY MASS INDEX: 38.28 KG/M2 | DIASTOLIC BLOOD PRESSURE: 89 MMHG

## 2024-03-29 DIAGNOSIS — R01.1 HEART MURMUR: ICD-10-CM

## 2024-03-29 LAB
AORTIC ARCH: 3 CM
AORTIC DIMENSIONLESS INDEX: 0.8 (DI)
ASCENDING AORTA: 2.8 CM
BH CV ECHO MEAS - ACS: 1.84 CM
BH CV ECHO MEAS - AO MAX PG: 10.4 MMHG
BH CV ECHO MEAS - AO MEAN PG: 5 MMHG
BH CV ECHO MEAS - AO ROOT DIAM: 3.3 CM
BH CV ECHO MEAS - AO V2 MAX: 161 CM/SEC
BH CV ECHO MEAS - AO V2 VTI: 31.8 CM
BH CV ECHO MEAS - AVA(I,D): 2.49 CM2
BH CV ECHO MEAS - EDV(CUBED): 72.8 ML
BH CV ECHO MEAS - EDV(MOD-SP2): 64 ML
BH CV ECHO MEAS - EDV(MOD-SP4): 64 ML
BH CV ECHO MEAS - EF(MOD-BP): 64.2 %
BH CV ECHO MEAS - EF(MOD-SP2): 64.1 %
BH CV ECHO MEAS - EF(MOD-SP4): 65.6 %
BH CV ECHO MEAS - ESV(CUBED): 18.4 ML
BH CV ECHO MEAS - ESV(MOD-SP2): 23 ML
BH CV ECHO MEAS - ESV(MOD-SP4): 22 ML
BH CV ECHO MEAS - FS: 36.8 %
BH CV ECHO MEAS - IVS/LVPW: 0.76 CM
BH CV ECHO MEAS - IVSD: 0.94 CM
BH CV ECHO MEAS - LAT PEAK E' VEL: 9.9 CM/SEC
BH CV ECHO MEAS - LV DIASTOLIC VOL/BSA (35-75): 32.9 CM2
BH CV ECHO MEAS - LV MASS(C)D: 152.4 GRAMS
BH CV ECHO MEAS - LV MAX PG: 7.8 MMHG
BH CV ECHO MEAS - LV MEAN PG: 3 MMHG
BH CV ECHO MEAS - LV SYSTOLIC VOL/BSA (12-30): 11.3 CM2
BH CV ECHO MEAS - LV V1 MAX: 140 CM/SEC
BH CV ECHO MEAS - LV V1 VTI: 26.1 CM
BH CV ECHO MEAS - LVIDD: 4.2 CM
BH CV ECHO MEAS - LVIDS: 2.6 CM
BH CV ECHO MEAS - LVOT AREA: 3 CM2
BH CV ECHO MEAS - LVOT DIAM: 1.96 CM
BH CV ECHO MEAS - LVPWD: 1.23 CM
BH CV ECHO MEAS - MED PEAK E' VEL: 12.2 CM/SEC
BH CV ECHO MEAS - MV A DUR: 0.11 SEC
BH CV ECHO MEAS - MV A MAX VEL: 79.9 CM/SEC
BH CV ECHO MEAS - MV DEC SLOPE: 332.6 CM/SEC2
BH CV ECHO MEAS - MV DEC TIME: 0.28 SEC
BH CV ECHO MEAS - MV E MAX VEL: 84.2 CM/SEC
BH CV ECHO MEAS - MV E/A: 1.05
BH CV ECHO MEAS - MV MAX PG: 2.39 MMHG
BH CV ECHO MEAS - MV MEAN PG: 1.24 MMHG
BH CV ECHO MEAS - MV P1/2T: 69.6 MSEC
BH CV ECHO MEAS - MV V2 VTI: 25.7 CM
BH CV ECHO MEAS - MVA(P1/2T): 3.2 CM2
BH CV ECHO MEAS - MVA(VTI): 3.1 CM2
BH CV ECHO MEAS - PA ACC TIME: 0.1 SEC
BH CV ECHO MEAS - PA V2 MAX: 111.3 CM/SEC
BH CV ECHO MEAS - PULM A REVS DUR: 0.11 SEC
BH CV ECHO MEAS - PULM A REVS VEL: 38.8 CM/SEC
BH CV ECHO MEAS - PULM DIAS VEL: 46.5 CM/SEC
BH CV ECHO MEAS - PULM S/D: 1.12
BH CV ECHO MEAS - PULM SYS VEL: 52 CM/SEC
BH CV ECHO MEAS - QP/QS: 0.7
BH CV ECHO MEAS - RAP SYSTOLE: 3 MMHG
BH CV ECHO MEAS - RV MAX PG: 2.45 MMHG
BH CV ECHO MEAS - RV V1 MAX: 78.2 CM/SEC
BH CV ECHO MEAS - RV V1 VTI: 17.4 CM
BH CV ECHO MEAS - RVOT DIAM: 2.02 CM
BH CV ECHO MEAS - RVSP: 11 MMHG
BH CV ECHO MEAS - SI(MOD-SP2): 21.1 ML/M2
BH CV ECHO MEAS - SI(MOD-SP4): 21.6 ML/M2
BH CV ECHO MEAS - SUP REN AO DIAM: 2 CM
BH CV ECHO MEAS - SV(LVOT): 79.1 ML
BH CV ECHO MEAS - SV(MOD-SP2): 41 ML
BH CV ECHO MEAS - SV(MOD-SP4): 42 ML
BH CV ECHO MEAS - SV(RVOT): 55.7 ML
BH CV ECHO MEAS - TAPSE (>1.6): 1.79 CM
BH CV ECHO MEAS - TR MAX PG: 8.2 MMHG
BH CV ECHO MEAS - TR MAX VEL: 143.3 CM/SEC
BH CV ECHO MEASUREMENTS AVERAGE E/E' RATIO: 7.62
BH CV XLRA - RV BASE: 2.42 CM
BH CV XLRA - RV LENGTH: 7.4 CM
BH CV XLRA - RV MID: 1.99 CM
BH CV XLRA - TDI S': 12.5 CM/SEC
LEFT ATRIUM VOLUME INDEX: 20.5 ML/M2
SINUS: 2.6 CM
STJ: 2.47 CM

## 2024-03-29 PROCEDURE — 93306 TTE W/DOPPLER COMPLETE: CPT

## 2024-03-29 NOTE — PROGRESS NOTES
Hello!    I have reviewed your heart echocardiogram.. There were no significant abnormalities.  Your heart pumping is great, there is slight leaking of one of your valves but it does not appear to be impacting the functioning of your heart.  We will keep an eye out for any concerning symptoms for worsening heart function (new chest pain, shortness of breath with activity, leg swelling) and can repeat the echo if needed.    If you have any questions, please let us know.   Thank you!  Dr. Newton

## 2024-04-19 ENCOUNTER — OFFICE VISIT (OUTPATIENT)
Dept: FAMILY MEDICINE CLINIC | Facility: CLINIC | Age: 39
End: 2024-04-19
Payer: COMMERCIAL

## 2024-04-19 VITALS
SYSTOLIC BLOOD PRESSURE: 118 MMHG | DIASTOLIC BLOOD PRESSURE: 84 MMHG | WEIGHT: 206 LBS | BODY MASS INDEX: 37.91 KG/M2 | HEIGHT: 62 IN | HEART RATE: 89 BPM | OXYGEN SATURATION: 97 %

## 2024-04-19 DIAGNOSIS — R01.1 HEART MURMUR: ICD-10-CM

## 2024-04-19 DIAGNOSIS — F41.1 GAD (GENERALIZED ANXIETY DISORDER): ICD-10-CM

## 2024-04-19 DIAGNOSIS — F33.0 MILD EPISODE OF RECURRENT MAJOR DEPRESSIVE DISORDER: Primary | ICD-10-CM

## 2024-04-19 DIAGNOSIS — I10 PRIMARY HYPERTENSION: ICD-10-CM

## 2024-04-19 RX ORDER — HYDROXYZINE HYDROCHLORIDE 25 MG/1
25 TABLET, FILM COATED ORAL 3 TIMES DAILY PRN
Qty: 90 TABLET | Refills: 3 | Status: SHIPPED | OUTPATIENT
Start: 2024-04-19

## 2024-04-19 NOTE — PATIENT INSTRUCTIONS
MH plan:  - Add hydroxyzine 25mg 3x daily as needed for panic  - Cont counseling   - Meditation: check out Insight Timer (free diana)  - Sleep hygiene  - Increase physical activity as tolerated- goal 120mins/week  - Acupressure  - Supplements: magnesium, ashwaganda  - May make appt for Dr. Newton's acupuncture clinic if desired- Tuesdays, $100 per visit  - If you experience any thoughts of harming yourself or someone else, please go to the ER immediately.     Resources:  https://www.apa.org/topics/anxiety/    https://www.apa.org/topics/depression/    https://sleepeducation.org/healthy-sleep/healthy-sleep-habits/    https://www.[facility].org/cancer-care/patient-education/acupressure-stress-and-anxiety    HTN plan  - The symptoms of organ damage from hypertension are very subtle until there is significant damage, so prevention is key through lifestyle modifications: reduce salt intake to less than 1500mg daily (about a teaspoon), , increase intake of unprocessed fresh foods, avoid processed sugar and flour, work up to 120 minutes of exercise weekly as tolerated. Consider trying the DASH diet, or Mediterranean diet  - Goal BP: <140/90  - go to the optometrist every year to monitor for retinopathy  -  Check BP every day with automatic arm cuff. Ensure BP cuff is on bare skin, you are seated with feet flat on ground and legs uncrossed, the arm is at the level of the heart, and you are not talking during the BP measurement. Keep a log of your BP and bring it to your next appointment.     Check out https://www.heart.org/en/health-topics/high-blood-pressure

## 2024-04-19 NOTE — Clinical Note
April 19, 2024     Patient: Brittany Russo   YOB: 1985   Date of Visit: 4/19/2024       To Whom It May Concern:    It is my medical opinion that Brittany Russo may return to work in two days.         Sincerely,        Coty Newton MD    CC: No Recipients

## 2024-04-19 NOTE — PROGRESS NOTES
Answers submitted by the patient for this visit:  Other (Submitted on 2024)  Please describe your symptoms.: Recheck on blood pressure. Discussion of EKG. Talk about anxiety meds.  Have you had these symptoms before?: Yes  How long have you been having these symptoms?: Greater than 2 weeks  Please list any medications you are currently taking for this condition.: Will bring with me tomorrow  Please describe any probable cause for these symptoms. : Panic attacks  Primary Reason for Visit (Submitted on 2024)  What is the primary reason for your visit?: Other    Chief Complaint  Chief Complaint   Patient presents with    Hypertension    Depression    Anxiety       Subjective    History of Present Illness  Brittany Russo is a 38 y.o. female presents to [facility] for followup of depression and HTN.     Current treatment plan: pristiq 50mg  Patient symptoms described as anxiety is doing fine. Since her sister  last year she noticed an increase in panic attacks. If she has rough week she will sometimes have a panic attack at the end of the day- heart racing, breathing fast, feeling like she needs to move. Her previous doctor gave her lorazepam, she still has a few.   Patient is currently in counseling, feels it is helping. and would like to discuss if it is okay for her to continue taking them or if she should get a new prescription.  Patient does feel symptoms of panic  monthly  Patient does have trouble sleeping: trouble falling asleep  Patient does feel their medications are working, side effects are not present.   Suicidal ideation: denied by patient  Patient has had no prior hospitalizations.  No legal history noted today.     PHQ-9 Depression Screening  Little interest or pleasure in doing things? 0-->not at all   Feeling down, depressed, or hopeless? 0-->not at all   Trouble falling or staying asleep, or sleeping too much?     Feeling tired or having little energy?    "  Poor appetite or overeating?     Feeling bad about yourself - or that you are a failure or have let yourself or your family down?     Trouble concentrating on things, such as reading the newspaper or watching television?     Moving or speaking so slowly that other people could have noticed? Or the opposite - being so fidgety or restless that you have been moving around a lot more than usual?     Thoughts that you would be better off dead, or of hurting yourself in some way?     PHQ-9 Total Score 0   If you checked off any problems, how difficult have these problems made it for you to do your work, take care of things at home, or get along with other people?       Patient has been checking BP at home. Home blood pressure log has been reading 120-130s/70-90s  Current treatment regimen: valsartan 80mg daily  Previous treatment regimen: labetalol  Pt has  been taking blood pressure medication as prescribed.   Patient is not using tobacco or nicotine products.   Patient described diet as could improve salt intake  Patient describes physical activity busy with her kids- apple watch says she walks 3 miles/day    Red flags:   There is no headache.  There is no dizziness.  There is no vision changes.  There is no chest pain.  There is no cough.  There is no leg swelling.  There is no dark urine.     Objective   Vitals:    04/19/24 1339   BP: 118/84   Pulse: 89   SpO2: 97%   Weight: 93.4 kg (206 lb)   Height: 157.5 cm (62.01\")      Physical Exam  Vitals reviewed.   Constitutional:       Appearance: Normal appearance.   HENT:      Head: Normocephalic and atraumatic.   Cardiovascular:      Comments: Well perfused  Pulmonary:      Effort: Pulmonary effort is normal. No respiratory distress.   Abdominal:      General: There is no distension.   Musculoskeletal:         General: Normal range of motion.   Neurological:      Mental Status: She is alert. Mental status is at baseline.          The following data was reviewed by: " Coty Newton MD on 04/19/2024:  CMP          2/26/2024    11:19   CMP   Glucose 93    BUN 10    Creatinine 0.66    Sodium 140    Potassium 4.6    Chloride 103    Calcium 9.3    Total Protein 6.9    Albumin 4.3    Globulin 2.6    Total Bilirubin 0.2    Alkaline Phosphatase 107    AST (SGOT) 15    ALT (SGPT) 17    BUN/Creatinine Ratio 15      CBC          2/26/2024    11:19   CBC   WBC 8.9    RBC 4.70    Hemoglobin 13.3    Hematocrit 42.2    MCV 90    MCH 28.3    MCHC 31.5    RDW 13.1    Platelets 284      Lipid Panel          2/26/2024    11:19   Lipid Panel   Total Cholesterol 246    Triglycerides 272    HDL Cholesterol 49    VLDL Cholesterol 50    LDL Cholesterol  147      TSH          2/26/2024    11:19   TSH   TSH 1.250      Most Recent A1C          2/26/2024    11:19   HGBA1C Most Recent   Hemoglobin A1C 5.6        Cardiology studies echo 2024 and last PCP note      Assessment and Plan  Brittany Russo is a 38 y.o. female presents to [facility] today for followup of depression, anxiety and hypertension.      Diagnoses and all orders for this visit:    1. Mild episode of recurrent major depressive disorder (Primary)  Overview:  With stable anxiety/depression, symptoms well controlled on current dose. Patient not SI/HI.  Continue current med regimen, advised continuing counseling.  AVS with nonpharmacologic supportive strategies.        2. ANDREA (generalized anxiety disorder)  Comments:  Episodes of panic, previously managed with lorazepam.  Discussed risks of continued use of benzo class.  Patient agreeable to try hydroxyzine  Orders:  -     hydrOXYzine (ATARAX) 25 MG tablet; Take 1 tablet by mouth 3 (Three) Times a Day As Needed for Anxiety, Allergies or Itching. Indications: Feeling Anxious  Dispense: 90 tablet; Refill: 3    3. Primary hypertension  Overview:  With chronic HTN, currently Controlled on current regimen. Patient is compliant with medications.  There is no evidence of  end-organ damage on exam.   - No med changes      - lab workup: Up-to-date  - pt to make appt with optometry for dilated eye exam, check fundus for evidence of retinopathy  - Pt to measure BP on own several times weekly, at varying times of day, journaling readings to be brought in to appointments for review with physician. Reviewed correct method of home BP measurement. Goal BP < 140/90.   -  Reviewed DASH diet and HTN management in AVS      4. Heart murmur  Overview:  Echo 2024.  Normal heart function, trace tricuspid regurgitation          Patient voiced understanding and agreement with plan of care and had no further questions or concerns at this time.     Problems addressed:  established problem: inadequately controlled,, established problem: well controlled, established problem: stable  Complexity: labs reviewed yes, independently reviewied diagnositic tests or imaging previously reviewed by another physician  Risk: prescription drug management    Coty Newton MD  Family Medicine  [facility]      Follow Up  Return in about 6 months (around 10/19/2024) for Recheck.    Patient Instructions   MH plan:  - Add hydroxyzine 25mg 3x daily as needed for panic  - Cont counseling   - Meditation: check out Insight Timer (free diana)  - Sleep hygiene  - Increase physical activity as tolerated- goal 120mins/week  - Acupressure  - Supplements: magnesium, ashwaganda  - May make appt for Dr. Newton's acupuncture clinic if desired- Tuesdays, $100 per visit  - If you experience any thoughts of harming yourself or someone else, please go to the ER immediately.     Resources:  https://www.apa.org/topics/anxiety/    https://www.apa.org/topics/depression/    https://sleepeducation.org/healthy-sleep/healthy-sleep-habits/    https://www.[facility].org/cancer-care/patient-education/acupressure-stress-and-anxiety    HTN plan  - The symptoms of organ damage from hypertension are very subtle until there is significant damage, so  prevention is leyva through lifestyle modifications: reduce salt intake to less than 1500mg daily (about a teaspoon), , increase intake of unprocessed fresh foods, avoid processed sugar and flour, work up to 120 minutes of exercise weekly as tolerated. Consider trying the DASH diet, or Mediterranean diet  - Goal BP: <140/90  - go to the optometrist every year to monitor for retinopathy  -  Check BP every day with automatic arm cuff. Ensure BP cuff is on bare skin, you are seated with feet flat on ground and legs uncrossed, the arm is at the level of the heart, and you are not talking during the BP measurement. Keep a log of your BP and bring it to your next appointment.     Check out https://www.heart.org/en/health-topics/high-blood-pressure

## 2024-10-21 ENCOUNTER — OFFICE VISIT (OUTPATIENT)
Dept: FAMILY MEDICINE CLINIC | Facility: CLINIC | Age: 39
End: 2024-10-21
Payer: COMMERCIAL

## 2024-10-21 VITALS
OXYGEN SATURATION: 96 % | WEIGHT: 213 LBS | HEIGHT: 63 IN | DIASTOLIC BLOOD PRESSURE: 86 MMHG | HEART RATE: 95 BPM | SYSTOLIC BLOOD PRESSURE: 126 MMHG | BODY MASS INDEX: 37.74 KG/M2

## 2024-10-21 DIAGNOSIS — Z23 ENCOUNTER FOR IMMUNIZATION: ICD-10-CM

## 2024-10-21 DIAGNOSIS — K21.9 GASTROESOPHAGEAL REFLUX DISEASE WITHOUT ESOPHAGITIS: ICD-10-CM

## 2024-10-21 DIAGNOSIS — F41.1 GAD (GENERALIZED ANXIETY DISORDER): ICD-10-CM

## 2024-10-21 DIAGNOSIS — F33.0 MILD EPISODE OF RECURRENT MAJOR DEPRESSIVE DISORDER: ICD-10-CM

## 2024-10-21 DIAGNOSIS — I10 PRIMARY HYPERTENSION: ICD-10-CM

## 2024-10-21 DIAGNOSIS — F41.1 GAD (GENERALIZED ANXIETY DISORDER): Primary | ICD-10-CM

## 2024-10-21 PROCEDURE — 90480 ADMN SARSCOV2 VAC 1/ONLY CMP: CPT | Performed by: FAMILY MEDICINE

## 2024-10-21 PROCEDURE — 91320 SARSCV2 VAC 30MCG TRS-SUC IM: CPT | Performed by: FAMILY MEDICINE

## 2024-10-21 PROCEDURE — 99214 OFFICE O/P EST MOD 30 MIN: CPT | Performed by: FAMILY MEDICINE

## 2024-10-21 PROCEDURE — 3079F DIAST BP 80-89 MM HG: CPT | Performed by: FAMILY MEDICINE

## 2024-10-21 PROCEDURE — 90471 IMMUNIZATION ADMIN: CPT | Performed by: FAMILY MEDICINE

## 2024-10-21 PROCEDURE — 3074F SYST BP LT 130 MM HG: CPT | Performed by: FAMILY MEDICINE

## 2024-10-21 PROCEDURE — 1160F RVW MEDS BY RX/DR IN RCRD: CPT | Performed by: FAMILY MEDICINE

## 2024-10-21 PROCEDURE — 90656 IIV3 VACC NO PRSV 0.5 ML IM: CPT | Performed by: FAMILY MEDICINE

## 2024-10-21 PROCEDURE — 1159F MED LIST DOCD IN RCRD: CPT | Performed by: FAMILY MEDICINE

## 2024-10-21 RX ORDER — LANSOPRAZOLE 30 MG/1
30 CAPSULE, DELAYED RELEASE ORAL NIGHTLY
Qty: 90 CAPSULE | Refills: 0 | Status: SHIPPED | OUTPATIENT
Start: 2024-10-21

## 2024-10-21 RX ORDER — HYDROXYZINE HYDROCHLORIDE 25 MG/1
25 TABLET, FILM COATED ORAL 3 TIMES DAILY PRN
Qty: 90 TABLET | Refills: 3 | Status: SHIPPED | OUTPATIENT
Start: 2024-10-21

## 2024-10-21 NOTE — PATIENT INSTRUCTIONS
Today: Flu and COVID shot today    Meds: Try hydroxyzine before bed- can take 3x daily  Start PPI    HEARTBURN PLAN:  Lifestyle changes: elevate the head of your bed, avoid eating after 6pm, reduce intake of foods that worsen your heartburn, avoid tight-fitting clothes around the waist  Meds: Pepcid and Prevacid  EGD:  If unable to wean off prevacid in 3 mo  Red flags: trouble swallowing, unintentional weight loss, tarry/black bowel movements, blood in bowel movements, blood in vomit, abdominal/chest pain that does not go away, abdominal/chest pain worse after eating    MH plan:  - Start counseling if not currently established  - Meditation: check out Insight Timer (free diana)  - Sleep hygiene  - Increase physical activity as tolerated- goal 120mins/week  - Acupressure or EFT (links below)  - Supplements: magnesium, ashwaganda  - May make appt for Dr. Newton's acupuncture clinic if desired- Tuesdays, cash pay  - If you experience any thoughts of harming yourself or someone else, please go to the ER immediately.     Resources:  https://www.apa.org/topics/anxiety/    https://www.apa.org/topics/depression/    https://sleepeducation.org/healthy-sleep/healthy-[email or web address]/    https://[employer].[place].ca/Health/pages/conditions.aspx?eryx=ryj0831&lang=en-ca    https://www.[facility].org/cancer-care/patient-education/acupressure-stress-and-anxiety    Two groups who are established with insurance companies are [facility] ([address] [place] [address] [phone]), [facility] ([address] [phone]) for counseling and/or psychologic assessment.   [facility] does counseling and psychologic testing, but does not accept insurance ([address] [place] [address] [phone])    Psychology Today also lists counselors and therapists in [place]:  [email or web address]/[email or web address]/ky/[email or web address]

## 2024-10-21 NOTE — PROGRESS NOTES
"Chief Complaint  Chief Complaint   Patient presents with    Depression    Anxiety    Hypertension       Subjective    History of Present Illness  Brittany Russo is a 38 y.o. female presents to [facility] for followup    History of Present Illness  She reports an improvement in her mood, attributing it to the effectiveness of Pristiq, which she tolerates well without any side effects. However, she mentions occasional forgetfulness in taking the medication, experiencing a sensation she describes as \"zaps\" when this happens.    She also reports persistent difficulty in falling asleep and staying asleep, often waking up in the middle of the night due to severe acid reflux. Despite trying to avoid eating before bedtime and drinking plenty of water, she still experiences these awakenings. She takes Pepcid 20 mg before bed to manage this. She recalls having acid reflux during pregnancy, which subsided post-delivery, but has since returned. She was previously on lansoprazole and that worked well for her.    Additionally, she experiences anxiety during the night, which she finds distressing. She dislikes taking melatonin as it leaves her feeling groggy the next day. She has tried magnesium and other remedies without success. She recalls an incident where she woke up feeling faint, which triggered a panic attack due to her sister's history of brain aneurysm. She is unsure if an updated MRI would alleviate her anxiety but expresses a desire for something to help calm her down during the night. She does not endorse feelings of depression or hopelessness and continues to enjoy activities. She attends therapy sessions weekly or bi-weekly.    She has not received her influenza or COVID-19 vaccines.    Both her kids had upper respiratory infections last week, and then she contracted it. She had it for so long that she went to the [facility], where she was prescribed four different " "medications, including steroids, which helped.    FAMILY HISTORY  Her sister  from stroke/aneurysm.    Objective   Vitals:    10/21/24 1038   BP: 126/86   Pulse: 95   SpO2: 96%   Weight: 96.6 kg (213 lb)   Height: 160 cm (62.99\")      Physical Exam  Vitals reviewed.   Constitutional:       Appearance: Normal appearance.   HENT:      Head: Normocephalic and atraumatic.   Cardiovascular:      Comments: Well perfused  Pulmonary:      Effort: Pulmonary effort is normal. No respiratory distress.   Abdominal:      General: There is no distension.   Musculoskeletal:         General: Normal range of motion.   Neurological:      Mental Status: She is alert. Mental status is at baseline.        ANDREA-7 (General Anxiety Disorder-7) from [employer]  on 10/21/2024  ** All calculations should be rechecked by clinician prior to use **    RESULT SUMMARY:  14 points  Moderate anxiety disorder.    Functionally, the patient is “somewhat” having difficulty with life tasks due to their symptoms.      INPUTS:  Feeling nervous, anxious, or on edge --> 2 = More than half the days  Not being able to stop or control worrying --> 2 = More than half the days  Worrying too much about different things --> 2 = More than half the days  Trouble relaxing --> 2 = More than half the days  Being so restless that it's hard to sit still --> 2 = More than half the days  Becoming easily annoyed or irritable --> 2 = More than half the days  Feeling afraid as if something awful might happen --> 2 = More than half the days  Ask the patient: how difficult have these problems made it to do work, take care of things at home, or get along with other people? --> 1 = Somewhat difficult    PHQ-2 Depression Screening  Little interest or pleasure in doing things? Not at all   Feeling down, depressed, or hopeless? Not at all   PHQ-2 Total Score 0         The following data was reviewed by: Coty Newton MD on 10/21/2024:  CMP          2024    11:19   CMP "   Glucose 93    BUN 10    Creatinine 0.66    Sodium 140    Potassium 4.6    Chloride 103    Calcium 9.3    Total Protein 6.9    Albumin 4.3    Globulin 2.6    Total Bilirubin 0.2    Alkaline Phosphatase 107    AST (SGOT) 15    ALT (SGPT) 17    BUN/Creatinine Ratio 15      CBC          2/26/2024    11:19   CBC   WBC 8.9    RBC 4.70    Hemoglobin 13.3    Hematocrit 42.2    MCV 90    MCH 28.3    MCHC 31.5    RDW 13.1    Platelets 284      Lipid Panel          2/26/2024    11:19   Lipid Panel   Total Cholesterol 246    Triglycerides 272    HDL Cholesterol 49    VLDL Cholesterol 50    LDL Cholesterol  147      TSH          2/26/2024    11:19   TSH   TSH 1.250      A1C Last 3 Results          2/26/2024    11:19   HGBA1C Last 3 Results   Hemoglobin A1C 5.6      Last PCP note      Assessment and Plan  Brittany Russo is a 38 y.o. female presents to [facility] today for followup     Assessment & Plan  1. Anxiety.  Anxiety symptoms persist despite current treatment. A prescription for hydroxyzine 25 mg, to be taken three times daily as needed, will be refilled. She is advised to take one dose at bedtime and another if she wakes up feeling anxious. Counseling and meditation are encouraged, along with maintaining physical activity. A link for EFT tapping will be provided.  Dose of Pristiq can be increased if needed.    2. Depression.  Depression symptoms are well-managed with the current medication regimen. No changes are needed at this time.    3. Gastroesophageal Reflux Disease (GERD).  The patient reports worsening acid reflux symptoms, particularly at night, which are not adequately controlled with Pepcid. She will be started on lansoprazole. She is advised to avoid late meals and reduce intake of trigger foods. If discontinuation of lansoprazole is not possible within 3 months, a referral for endoscopy will be considered.    4. HTN  BP well-controlled today, continue current antihypertensive  therapy.    5. Health Maintenance.  She will receive her influenza and COVID-19 vaccines today.      Diagnoses and all orders for this visit:    1. ANDREA (generalized anxiety disorder) (Primary)  Comments:  Episodes of panic, previously managed with lorazepam.  Discussed risks of continued use of benzo class.  Patient agreeable to try hydroxyzine  Orders:  -     hydrOXYzine (ATARAX) 25 MG tablet; Take 1 tablet by mouth 3 (Three) Times a Day As Needed for Itching. Indications: Feeling Anxious, Feeling Tense  Dispense: 90 tablet; Refill: 3    2. ANDREA (generalized anxiety disorder)  -     hydrOXYzine (ATARAX) 25 MG tablet; Take 1 tablet by mouth 3 (Three) Times a Day As Needed for Itching. Indications: Feeling Anxious, Feeling Tense  Dispense: 90 tablet; Refill: 3    3. Mild episode of recurrent major depressive disorder  Overview:  With stable anxiety/depression, symptoms well controlled on current dose. Patient not SI/HI.  Continue current med regimen, advised continuing counseling.  AVS with nonpharmacologic supportive strategies.        4. Gastroesophageal reflux disease without esophagitis  -     lansoprazole (Prevacid) 30 MG capsule; Take 1 capsule by mouth Every Night. Indications: Gastroesophageal Reflux Disease  Dispense: 90 capsule; Refill: 0    5. Primary hypertension  Overview:  With chronic HTN, currently Controlled on current regimen. Patient is compliant with medications.  There is no evidence of end-organ damage on exam.   - No med changes      - lab workup: Up-to-date  - pt to make appt with optometry for dilated eye exam, check fundus for evidence of retinopathy  - Pt to measure BP on own several times weekly, at varying times of day, journaling readings to be brought in to appointments for review with physician. Reviewed correct method of home BP measurement. Goal BP < 140/90.   -  Reviewed DASH diet and HTN management in AVS      6. Encounter for immunization  -     Fluzone >6mos ([id number])  -      COVID-19 (Pfizer) 12yrs+ (COMIRNATY)        Patient voiced understanding and agreement with plan of care and had no further questions or concerns at this time.     Problems addressed:  established problem:  worsening or not responding to treatment, established problem: inadequately controlled,, established problem: stable  Complexity: labs reviewed yes  Risk: prescription drug management    Coty Newton MD  [facility]      Follow Up  Return in about 4 months (around 2/21/2025) for Annual physical.    Patient Instructions   Today: Flu and COVID shot today    Meds: Try hydroxyzine before bed- can take 3x daily  Start PPI    HEARTBURN PLAN:  Lifestyle changes: elevate the head of your bed, avoid eating after 6pm, reduce intake of foods that worsen your heartburn, avoid tight-fitting clothes around the waist  Meds: Pepcid and Prevacid  EGD:  If unable to wean off prevacid in 3 mo  Red flags: trouble swallowing, unintentional weight loss, tarry/black bowel movements, blood in bowel movements, blood in vomit, abdominal/chest pain that does not go away, abdominal/chest pain worse after eating    MH plan:  - Start counseling if not currently established  - Meditation: check out Insight Timer (free diana)  - Sleep hygiene  - Increase physical activity as tolerated- goal 120mins/week  - Acupressure or EFT (links below)  - Supplements: magnesium, ashwaganda  - May make appt for Dr. Newton's acupuncture clinic if desired- Tuesdays, cash pay  - If you experience any thoughts of harming yourself or someone else, please go to the ER immediately.     Resources:  https://www.apa.org/topics/anxiety/    https://www.apa.org/topics/depression/    https://sleepeducation.org/healthy-sleep/healthy-[email or web address]/    https://[facility].[place].ca/Health/pages/conditions.aspx?ppqs=tad1614&lang=en-ca    https://www.[facility].org/cancer-care/patient-education/acupressure-stress-and-anxiety    Two groups who are  established with insurance companies are [facility] ([address], [place] [address] [phone]), [facility] ([address] [phone]) for counseling and/or psychologic assessment.   [facility] does counseling and psychologic testing, but does not accept insurance ([address], [place] [address] [phone])    Psychology Today also lists counselors and therapists in [place]: [email or web address]/[email or web address]           Patient or patient representative verbalized consent for the use of Ambient Listening during the visit with  Coty Newton MD for chart documentation. 10/21/2024  13:15 EDT

## 2024-11-12 ENCOUNTER — TELEPHONE (OUTPATIENT)
Dept: FAMILY MEDICINE CLINIC | Facility: CLINIC | Age: 39
End: 2024-11-12
Payer: COMMERCIAL

## 2024-12-26 ENCOUNTER — PROCEDURE VISIT (OUTPATIENT)
Dept: FAMILY MEDICINE CLINIC | Facility: CLINIC | Age: 39
End: 2024-12-26
Payer: COMMERCIAL

## 2024-12-26 VITALS
WEIGHT: 211.3 LBS | DIASTOLIC BLOOD PRESSURE: 93 MMHG | HEART RATE: 90 BPM | SYSTOLIC BLOOD PRESSURE: 134 MMHG | HEIGHT: 63 IN | OXYGEN SATURATION: 98 % | BODY MASS INDEX: 37.44 KG/M2 | RESPIRATION RATE: 18 BRPM

## 2024-12-26 DIAGNOSIS — Z53.8 UNSUCCESSFUL ATTEMPT TO INSERT INTRAUTERINE DEVICE (IUD): Primary | ICD-10-CM

## 2024-12-26 DIAGNOSIS — K21.9 GASTROESOPHAGEAL REFLUX DISEASE WITHOUT ESOPHAGITIS: ICD-10-CM

## 2024-12-26 DIAGNOSIS — Z12.31 ENCOUNTER FOR SCREENING MAMMOGRAM FOR MALIGNANT NEOPLASM OF BREAST: ICD-10-CM

## 2024-12-26 LAB
B-HCG UR QL: NEGATIVE
EXPIRATION DATE: NORMAL
INTERNAL NEGATIVE CONTROL: NORMAL
INTERNAL POSITIVE CONTROL: NORMAL
Lab: NORMAL

## 2024-12-26 RX ORDER — MISOPROSTOL 200 UG/1
TABLET ORAL
Qty: 2 TABLET | Refills: 0 | Status: SHIPPED | OUTPATIENT
Start: 2024-12-26

## 2024-12-26 RX ORDER — DIAZEPAM 2 MG/1
2 TABLET ORAL ONCE
Qty: 1 TABLET | Refills: 0 | Status: SHIPPED | OUTPATIENT
Start: 2024-12-26 | End: 2024-12-26

## 2024-12-26 NOTE — PROGRESS NOTES
Chief Complaint   Patient presents with    Contraception       SUBJECTIVE  Brittany Russo is a 39 y.o. female who presents to [unfilled] today for IUD insertion.  Pregnancy test today was Negative.  Patient is currently using condoms for contraception.  Patient has had an IUD before- 2 before she had her children, born via csection  Patient has not had an STI before.  Patient denies pelvic pain, abnormal vaginal discharge, dysuria, fever today.    OBJECTIVE  Vitals:    12/26/24 0800   BP: 134/93   Pulse: 90   Resp: 18   SpO2: 98%       Physical Exam  Vitals reviewed. Exam conducted with a chaperone present.   Constitutional:       Appearance: Normal appearance.   HENT:      Head: Normocephalic and atraumatic.   Cardiovascular:      Comments: Well perfused  Pulmonary:      Effort: Pulmonary effort is normal. No respiratory distress.   Abdominal:      General: There is no distension.   Genitourinary:     General: Normal vulva.      Vagina: Normal.      Comments: Cervical os stenotic, unable to pass uterine sound through inner os  Musculoskeletal:         General: Normal range of motion.   Neurological:      Mental Status: She is alert. Mental status is at baseline.       POC Pregnancy, Urine  Order: [id number]  Status: Final result       Visible to patient: No (scheduled for 12/26/2024  9:07 AM)       Next appt: 01/14/2025 at 08:00 AM in Family Medicine (Coty Newton MD)       Dx: Unsuccessful attempt to insert intrau...    Specimen Information: Urine   0 Result Notes            Component  Ref Range & Units 08:06  (12/26/24) 2 mo ago  (10/17/24) 7 mo ago  (5/11/24) 7 mo ago  (5/11/24) 10 mo ago  (2/26/24) 11 mo ago  (1/17/24) 11 mo ago  (1/16/24)   HCG, Urine, QL  Negative Negative                PROCEDURE:  Patient was appropriately consented for the procedure and placed in the supine position on the exam table.    The speculum was inserted and the cervix was identified.  Betadine was used to clean the cervix x 3  swabs. A cervical block was placed using a 21g needle and 1mL of 1% lidocaine with epi at 12, 3, 6, and 9 o clock on the cervix. After local anesthetic was applied, the tenaculum was applied to the anterior/superior cervix. A sterile intrauterine sound was used to attempt to dilate the cervix and sound the uterine depth. Unable to pass sound through inner os due to cervical stenosis. After multiple attempts, the speculum was removed.     The patient tolerated the procedure well, without any S/S of vasovagal responses, EBL less than 5 ml.      ASSESSMENT/PLAN  Brittany Russo is a 39 y.o. female who presents today for IUD insertion. Urine Hcg today Negative.   - procedure per procedure note, unsuccessful  - RTC, will try cervical softening with cytotec night before procedure and valium to ease anxiety and pain, patient will have  to procedure if taking valium  - if unable to insert with cervical softening, will refer to gyn for ultrasound    Diagnoses and all orders for this visit:    1. Unsuccessful attempt to insert intrauterine device (IUD) (Primary)  -     POC Pregnancy, Urine  -     miSOPROStol (Cytotec) 200 MCG tablet; Insert 1 tablet intravaginally night before IUD insertion, insert second tablet intravaginally at least 4 hours before IUD insertion  Dispense: 2 tablet; Refill: 0  -     diazePAM (Valium) 2 MG tablet; Take 1 tablet by mouth 1 (One) Time for 1 dose. 30 minutes before IUD insertion  Dispense: 1 tablet; Refill: 0    2. Gastroesophageal reflux disease without esophagitis  Comments:  Persistent despite multiple PPI and max tolerate OTC therapy, will ref for EGD,  Orders:  -     Ambulatory referral for Screening EGD    3. Encounter for screening mammogram for malignant neoplasm of breast  -     Mammo Screening Digital Tomosynthesis Bilateral With CAD; Future          Patient voiced understanding and agreement with plan of care and had no further questions or concerns at this time.     I spent 20  minutes on this encounter, including chart review of any relevant previous encounters, labs, and imaging, not including time spent on attempted IUD insertion.   Problems addressed:  established problem: inadequately controlled,  Complexity: labs ordered yes  Risk: prescription drug management    Coty Newton MD  Family Medicine  [facility]      Return Need rebook IUD insertion, for Next scheduled follow up.

## 2024-12-26 NOTE — PATIENT INSTRUCTIONS
Unable to insert IUD today- cervix stenotic, unable to pass sound all the way through without causing harm.     Reschedule- night prior to IUD, insert cytotec 200mcg into vagina as far as you can. May experience some cramping.   At least 4 hrs before appt- insert second tab.    30 min before appt- ibuprofen 800mg with food/water and if desired, 2mg valium- may need  if taking valium.

## 2025-01-14 ENCOUNTER — PROCEDURE VISIT (OUTPATIENT)
Dept: FAMILY MEDICINE CLINIC | Facility: CLINIC | Age: 40
End: 2025-01-14
Payer: COMMERCIAL

## 2025-01-14 VITALS
HEIGHT: 63 IN | HEART RATE: 91 BPM | RESPIRATION RATE: 17 BRPM | SYSTOLIC BLOOD PRESSURE: 120 MMHG | WEIGHT: 223 LBS | BODY MASS INDEX: 39.51 KG/M2 | DIASTOLIC BLOOD PRESSURE: 72 MMHG | OXYGEN SATURATION: 98 %

## 2025-01-14 DIAGNOSIS — K21.9 GASTROESOPHAGEAL REFLUX DISEASE WITHOUT ESOPHAGITIS: ICD-10-CM

## 2025-01-14 DIAGNOSIS — Z30.430 ENCOUNTER FOR IUD INSERTION: Primary | ICD-10-CM

## 2025-01-14 LAB
B-HCG UR QL: NEGATIVE
EXPIRATION DATE: NORMAL
INTERNAL NEGATIVE CONTROL: NEGATIVE
INTERNAL POSITIVE CONTROL: POSITIVE
Lab: NORMAL

## 2025-01-14 PROCEDURE — 58300 INSERT INTRAUTERINE DEVICE: CPT | Performed by: FAMILY MEDICINE

## 2025-01-14 PROCEDURE — 99212 OFFICE O/P EST SF 10 MIN: CPT | Performed by: FAMILY MEDICINE

## 2025-01-14 PROCEDURE — 81025 URINE PREGNANCY TEST: CPT | Performed by: FAMILY MEDICINE

## 2025-01-14 RX ORDER — LANSOPRAZOLE 30 MG/1
30 CAPSULE, DELAYED RELEASE ORAL NIGHTLY
Qty: 90 CAPSULE | Refills: 1 | Status: SHIPPED | OUTPATIENT
Start: 2025-01-14

## 2025-01-14 RX ORDER — DIAZEPAM 2 MG/1
TABLET ORAL
COMMUNITY
Start: 2024-12-30 | End: 2025-01-14

## 2025-01-14 NOTE — PATIENT INSTRUCTIONS
IUD PLAN:  Congratulations! You have chosen one of the safest and most effective form of birth control! Your device is good for 7 years.     - Take the rest of today easy- you may use a heating pad for 30 minutes every 2 hours, 800mg ibuprofen every 8 hrs or tylenol 1000mg every 6 hours with food and water.If you need a note for work or school please let us know.   - Cramping and bleeding up to a week after IUD insertion is normal. It is NOT normal to have cramping that does not respond to tylenol/ibuprofen, heavy bleeding that does not improve within a week, foul-smelling discharge, nausea/vomiting, or temperature > 100.4. Please go to the ER if you experience these symptoms.   - You may have irregular periods for 3-6 months after IUD insertion as your body adjusts to the device, this is normal. If you are unhappy with the IUD after 3-6 months let me know and we will discuss removal vs other strategies.   - Although most of the hormone stays in the pelvis, you may experience some mood changes. It's uncommon, but if you experience sudden worsening of anxiety/depression or thoughts of harming yourself, please call the clinic or go to the ER immediately. We may need to remove your device.   - You do not need to return to clinic for a string check unless you are concerned your device is missing or dislodged. Check your strings monthly by inserting your fingers in the vagina and feeling for the strings, sometimes they twist backwards around the cervix.   - If you cannot feel your strings, your device is missing or dislodged, please contact the clinic immediately and use barrier contraception when having sex until your IUD is confirmed to be in the correct location.

## 2025-01-14 NOTE — PROGRESS NOTES
Chief Complaint   Patient presents with    Contraception       SUBJECTIVE  Brittany Russo is a 39 y.o. female who presents to today for IUD insertion. Prior IUD insertion unsuccessful due to stenosis of inner os. Patient RX intravaginal cytotec x 2 and valium prior to procedure today.   Pregnancy test today was Negative.  Patient is currently using condoms for contraception.  Patient has had an IUD before.  Patient has not had an STI before.  Patient denies pelvic pain, abnormal vaginal discharge, dysuria, fever today.    OBJECTIVE  Vitals:    01/14/25 0817   BP: 120/72   Pulse: 91   Resp: 17   SpO2: 98%       Physical Exam  Vitals reviewed.   Constitutional:       Appearance: Normal appearance.   HENT:      Head: Normocephalic and atraumatic.   Cardiovascular:      Comments: Well perfused  Pulmonary:      Effort: Pulmonary effort is normal. No respiratory distress.   Abdominal:      General: There is no distension.   Musculoskeletal:         General: Normal range of motion.   Neurological:      Mental Status: She is alert. Mental status is at baseline.       POC Pregnancy, Urine  Order: [id number]  Status: Final result       Visible to patient: No (scheduled for 1/14/2025  9:37 AM)       Next appt: 02/21/2025 at 09:45 AM in Family Medicine (Coty Newton MD)       Dx: Encounter for IUD insertion    Specimen Information: Urine   0 Result Notes            Component  Ref Range & Units 08:36  (1/14/25) 2 wk ago  (12/26/24) 2 mo ago  (10/17/24) 8 mo ago  (5/11/24) 8 mo ago  (5/11/24) 10 mo ago  (2/26/24) 12 mo ago  (1/17/24)   HCG, Urine, QL  Negative Negative                PROCEDURE:  Patient was appropriately consented for the procedure and placed in the supine position on the exam table.    The speculum was inserted and the cervix was identified.  Betadine was used to clean the cervix x 3 swabs. A cervical block was placed using a 21g needle and 1mL of 1% lidocaine with epi at 12, 3, 6, and 9 o clock on the cervix.  After local anesthetic was applied, the tenaculum was applied to the anterior/superior cervix. A sterile intrauterine sound was used to dilate the cervix and sound the uterus- there was significant resistance at the inner os. After sounding to a depth of 6 centimeters, an intrauterine device was inserted per  protocol with resistance at the inner os.  The string was cut to a 3 centimeters length and a sample of the remaining string was given to the patient for later comparison. The speculum was removed.     The patient tolerated the procedure well, without any S/S of vasovagal responses, EBL less than 5 ml.      ASSESSMENT/PLAN  Brittany Russo is a 39 y.o. female who presents today for IUD insertion. Urine Hcg today Negative.   - procedure per procedure note. IUD good for 7 years per device .  - aftercare handout provided- counseled pt on likely cramping and bleeding for up to 7 days, red flag symptoms, and that she may experience irregular bleeding for up to 3-6 mo. The patient agrees to return for fever, severe lower abdominal cramping, heavy bleeding, or purulent discharge.  The patient was counseled to check the strings herself monthly to ensure the IUD has not been displaced.   - Cont with symptomatic care with NSAIDs for pain control.      Diagnoses and all orders for this visit:    1. Encounter for IUD insertion (Primary)  -     POC Pregnancy, Urine  -     Levonorgestrel (MIRENA) 20 MCG/DAY IUD intrauterine device 1 each    2. Gastroesophageal reflux disease without esophagitis  Comments:  Controlled with Prevacid, has GI followup scheduled in March 2025. Will refill.  Orders:  -     lansoprazole (Prevacid) 30 MG capsule; Take 1 capsule by mouth Every Night. Indications: Gastroesophageal Reflux Disease  Dispense: 90 capsule; Refill: 1          Patient voiced understanding and agreement with plan of care and had no further questions or concerns at this time.   Medical student present  during appointment with patient consent; any time patient spent talking to the medical student or being examined by medical student not included in billable time.    Coty Newton MD  Family Medicine  [facility]    Return for Next scheduled follow up.    Patient Instructions   IUD PLAN:  Congratulations! You have chosen one of the safest and most effective form of birth control! Your device is good for 7 years.     - Take the rest of today easy- you may use a heating pad for 30 minutes every 2 hours, 800mg ibuprofen every 8 hrs or tylenol 1000mg every 6 hours with food and water.If you need a note for work or school please let us know.   - Cramping and bleeding up to a week after IUD insertion is normal. It is NOT normal to have cramping that does not respond to tylenol/ibuprofen, heavy bleeding that does not improve within a week, foul-smelling discharge, nausea/vomiting, or temperature > 100.4. Please go to the ER if you experience these symptoms.   - You may have irregular periods for 3-6 months after IUD insertion as your body adjusts to the device, this is normal. If you are unhappy with the IUD after 3-6 months let me know and we will discuss removal vs other strategies.   - Although most of the hormone stays in the pelvis, you may experience some mood changes. It's uncommon, but if you experience sudden worsening of anxiety/depression or thoughts of harming yourself, please call the clinic or go to the ER immediately. We may need to remove your device.   - You do not need to return to clinic for a string check unless you are concerned your device is missing or dislodged. Check your strings monthly by inserting your fingers in the vagina and feeling for the strings, sometimes they twist backwards around the cervix.   - If you cannot feel your strings, your device is missing or dislodged, please contact the clinic immediately and use barrier contraception when having sex until your IUD is  confirmed to be in the correct location.

## 2025-01-15 ENCOUNTER — PATIENT MESSAGE (OUTPATIENT)
Dept: FAMILY MEDICINE CLINIC | Facility: CLINIC | Age: 40
End: 2025-01-15
Payer: COMMERCIAL

## 2025-01-16 ENCOUNTER — TELEPHONE (OUTPATIENT)
Dept: GASTROENTEROLOGY | Facility: CLINIC | Age: 40
End: 2025-01-16
Payer: COMMERCIAL

## 2025-01-17 ENCOUNTER — TELEPHONE (OUTPATIENT)
Dept: GASTROENTEROLOGY | Facility: CLINIC | Age: 40
End: 2025-01-17
Payer: COMMERCIAL

## 2025-01-17 NOTE — TELEPHONE ENCOUNTER
Left vmail to reschedule due to ezra renteria out of office     Ok for hub to relay and reschedule

## 2025-02-16 DIAGNOSIS — I10 PRIMARY HYPERTENSION: ICD-10-CM

## 2025-02-17 RX ORDER — VALSARTAN 80 MG/1
80 TABLET ORAL DAILY
Qty: 90 TABLET | Refills: 3 | Status: SHIPPED | OUTPATIENT
Start: 2025-02-17

## 2025-02-21 ENCOUNTER — OFFICE VISIT (OUTPATIENT)
Dept: FAMILY MEDICINE CLINIC | Facility: CLINIC | Age: 40
End: 2025-02-21
Payer: COMMERCIAL

## 2025-02-21 VITALS
DIASTOLIC BLOOD PRESSURE: 85 MMHG | WEIGHT: 227 LBS | BODY MASS INDEX: 40.22 KG/M2 | OXYGEN SATURATION: 97 % | HEIGHT: 63 IN | SYSTOLIC BLOOD PRESSURE: 126 MMHG | HEART RATE: 87 BPM

## 2025-02-21 DIAGNOSIS — I10 PRIMARY HYPERTENSION: ICD-10-CM

## 2025-02-21 DIAGNOSIS — Z97.5 IUD (INTRAUTERINE DEVICE) IN PLACE: ICD-10-CM

## 2025-02-21 DIAGNOSIS — E78.2 MODERATE MIXED HYPERLIPIDEMIA NOT REQUIRING STATIN THERAPY: ICD-10-CM

## 2025-02-21 DIAGNOSIS — H61.23 BILATERAL IMPACTED CERUMEN: ICD-10-CM

## 2025-02-21 DIAGNOSIS — R06.83 SNORING: ICD-10-CM

## 2025-02-21 DIAGNOSIS — E66.813 CLASS 3 SEVERE OBESITY DUE TO EXCESS CALORIES WITH SERIOUS COMORBIDITY AND BODY MASS INDEX (BMI) OF 40.0 TO 44.9 IN ADULT: ICD-10-CM

## 2025-02-21 DIAGNOSIS — F33.0 MILD EPISODE OF RECURRENT MAJOR DEPRESSIVE DISORDER: ICD-10-CM

## 2025-02-21 DIAGNOSIS — F41.1 GAD (GENERALIZED ANXIETY DISORDER): ICD-10-CM

## 2025-02-21 DIAGNOSIS — E66.01 CLASS 3 SEVERE OBESITY DUE TO EXCESS CALORIES WITH SERIOUS COMORBIDITY AND BODY MASS INDEX (BMI) OF 40.0 TO 44.9 IN ADULT: ICD-10-CM

## 2025-02-21 DIAGNOSIS — K21.9 GASTROESOPHAGEAL REFLUX DISEASE WITHOUT ESOPHAGITIS: Primary | ICD-10-CM

## 2025-02-21 DIAGNOSIS — Z00.00 ANNUAL PHYSICAL EXAM: ICD-10-CM

## 2025-02-21 RX ORDER — DESVENLAFAXINE 50 MG/1
50 TABLET, FILM COATED, EXTENDED RELEASE ORAL DAILY
Qty: 90 TABLET | Refills: 3 | Status: SHIPPED | OUTPATIENT
Start: 2025-02-21 | End: 2026-02-21

## 2025-02-21 RX ORDER — PANTOPRAZOLE SODIUM 40 MG/1
40 TABLET, DELAYED RELEASE ORAL DAILY
Qty: 30 TABLET | Refills: 0 | Status: SHIPPED | OUTPATIENT
Start: 2025-02-21

## 2025-02-21 RX ORDER — SUCRALFATE 1 G/1
1 TABLET ORAL
Qty: 90 TABLET | Refills: 0 | Status: SHIPPED | OUTPATIENT
Start: 2025-02-21

## 2025-02-21 NOTE — PROGRESS NOTES
Chief Complaint  Chief Complaint   Patient presents with    Annual Exam    Depression    Heartburn    Snoring       Subjective    History of Present Illness  Brittany Russo is a 39 y.o. female presents to [facility] for annual exam.    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 She is currently focusing on weight loss through dietary modifications, she has not done very much physical activity with the cold weather. She has been engaging in a video series about her sister, which she finds therapeutic. She is currently seeing a therapist whom she feels is a good fit for her. She reports that Pristiq and hydroxyzine have been effective in managing her mood. She has a strong support system- , great family, parents, siblings. She reports persistent lack of refreshing sleep, feeling tired throughout the day, snoring and co-sleeping with her children, aged 4 and 7. She has had her tonsils, adenoids, and uvula removed, but continues to snore. She has never had a sleep study. She often feels fatigued upon waking and experiences headaches, dry mouth, and bruxism.    She has no new diagnoses or surgical procedures since her last visit. She recently started using bifocals and is in the process of adjusting to them. She has a mammogram scheduled. She does not require any medication refills at this time, as she recently refilled her blood pressure medication.    She continues to experience reflux symptoms, necessitating the intake of 2 medications. Occasionally, she experiences severe symptoms upon waking, requiring an additional dose, resulting in a total nightly dosage of 90 mg. She has an upcoming appointment with a gastroenterologist on 03/18/2025.     She reports lighter menstrual periods with increased spotting since the insertion of an intrauterine device (IUD). She experiences typical menstrual cramps but no severe pain.      Current Outpatient  Medications on File Prior to Visit   Medication Sig Dispense Refill    hydrOXYzine (ATARAX) 25 MG tablet Take 1 tablet by mouth 3 (Three) Times a Day As Needed for Itching. Indications: Feeling Anxious, Feeling Tense 90 tablet 3    ibuprofen (ADVIL,MOTRIN) 800 MG tablet Take 1 tablet by mouth Every 8 (Eight) Hours As Needed for Mild Pain. 15 tablet 0    valsartan (DIOVAN) 80 MG tablet TAKE 1 TABLET BY MOUTH DAILY FOR HIGH BLOOD PRESSURE 90 tablet 3    [DISCONTINUED] desvenlafaxine (PRISTIQ) 50 MG 24 hr tablet Take 1 tablet by mouth Daily. Indications: Major Depressive Disorder 90 tablet 3    [DISCONTINUED] lansoprazole (Prevacid) 30 MG capsule Take 1 capsule by mouth Every Night. Indications: Gastroesophageal Reflux Disease 90 capsule 1    [DISCONTINUED] promethazine-dextromethorphan (PROMETHAZINE-DM) 6.25-15 MG/5ML syrup Take 5 mL by mouth 4 (Four) Times a Day As Needed for Cough. 118 mL 0     No current facility-administered medications on file prior to visit.       Patient Active Problem List   Diagnosis    IBS (irritable bowel syndrome)    Gastroesophageal reflux disease without esophagitis    Depression    ANDREA (generalized anxiety disorder)    Heart murmur    Hemorrhoids, external    Hypertension    Migraine headache without aura    History of pre-eclampsia    Family history of stroke due to aneurysm    Family history of bleeding or clotting disorder    Snoring    IUD (intrauterine device) in place       Past Medical History:   Diagnosis Date    Allergic     Anxiety     Depression     GERD (gastroesophageal reflux disease)     Heart murmur     Hiatal hernia     Hypertension 10 years ago    Initial patient encounter 2020    Irritable bowel syndrome     Normal pregnancy 2020    Scoliosis        Past Surgical History:   Procedure Laterality Date     SECTION  2017 and 3-3-2021    COLONOSCOPY  approx     pt does not recall results     INTRAUTERINE DEVICE INSERTION  2025    Mirena     TONSILLECTOMY AND ADENOIDECTOMY      UPPER GASTROINTESTINAL ENDOSCOPY  approx 2008    patient does not recall results     UVULECTOMY      WISDOM TOOTH EXTRACTION         Family History   Problem Relation Age of Onset    Memory loss Mother     Hyperlipidemia Father     Diabetes type II Father     Hypertension Father     Early death Sister     Stroke Sister     Factor V Leiden deficiency Sister     Hypertension Brother     Hypertension Brother     Dementia Maternal Grandmother     Stroke Maternal Grandfather 70    Diabetes type I Nephew     Breast cancer Neg Hx     Colon cancer Neg Hx        Health Maintenance Summary            Scheduled - MAMMOGRAM (Every 2 Years) Scheduled for 3/5/2025      No completion, postpone, or frequency change history exists for this topic.              Ordered - LIPID PANEL (Yearly) Ordered on 2/21/2025 02/26/2024  Lipid Panel    08/12/2020  LIPID PANEL    09/12/2019  LIPID PANEL              ANNUAL PHYSICAL (Yearly) Next due on 2/26/2025 02/26/2024  Done              BMI FOLLOWUP (Yearly) Next due on 2/26/2025 02/26/2024  Postponed until 2/27/2024 by Coty Newton MD (Pending event)    02/26/2024  Smart[employer]: WORKFLOW - QUALITY MEASUREMENT - DOCUMENTED WEIGHT FOLLOW-UP PLAN    02/26/2024  Smart[employer]: WORKFLOW - QUALITY MEASUREMENT - BMI FOLLOW UP CARE PLAN DOCUMENTED              PAP SMEAR (Every 5 Years) Next due on 4/30/2026 02/26/2024  Frequency changed to Every 5 Years by Coty Newton MD    04/30/2021  Patient-Reported (Performed Externally)              TDAP/TD VACCINES (2 - Td or Tdap) Next due on 2/26/2034 02/26/2024  Imm Admin: Tdap              HEPATITIS C SCREENING  Completed      02/26/2024  Hep C Virus Ab component of Hepatitis C Antibody              COVID-19 Vaccine (Series Information) Completed      10/21/2024  Imm Admin: COVID-19 (PFIZER) 12YRS+ (COMIRNATY)    10/24/2023  Imm Admin: COVID-19 (PFIZER) 12YRS+ (COMIRNATY)    10/13/2022  Imm  "Admin: COVID-19 (PFIZER) BIVALENT 12+YRS    11/08/2021  Imm Admin: COVID-19 (PFIZER) Purple Cap Monovalent    04/10/2021  Imm Admin: COVID-19 (PFIZER) Purple Cap Monovalent    Only the first 5 history entries have been loaded, but more history exists.              INFLUENZA VACCINE  Completed      10/21/2024  Imm Admin: Fluzone  >6mos    10/24/2023  Imm Admin: Influenza Injectable Mdck Pf Quad    10/13/2022  Imm Admin: Fluzone (or Fluarix & Flulaval for VFC) >6mos    09/08/2021  Imm Admin: Fluzone (or Fluarix & Flulaval for VFC) >6mos    10/12/2020  Imm Admin: Influenza, Unspecified    Only the first 5 history entries have been loaded, but more history exists.              Pneumococcal Vaccine 0-49 (Series Information) Aged Out      No completion, postpone, or frequency change history exists for this topic.                       Objective   Vitals:    02/21/25 0947   BP: 126/85   Pulse: 87   SpO2: 97%   Weight: 103 kg (227 lb)   Height: 160 cm (62.99\")                Physical Exam  Vitals reviewed.   Constitutional:       General: She is not in acute distress.     Appearance: Normal appearance.   HENT:      Head: Normocephalic and atraumatic.      Right Ear: Ear canal and external ear normal. There is impacted cerumen.      Left Ear: Ear canal and external ear normal. There is impacted cerumen.      Nose: Nose normal. No rhinorrhea.      Mouth/Throat:      Mouth: Mucous membranes are moist.      Pharynx: No posterior oropharyngeal erythema.   Eyes:      Extraocular Movements: Extraocular movements intact.      Conjunctiva/sclera: Conjunctivae normal.      Pupils: Pupils are equal, round, and reactive to light.   Neck:      Comments: No thyromegaly or thyroid nodule on palpation  Cardiovascular:      Rate and Rhythm: Normal rate and regular rhythm.      Pulses: Normal pulses.      Heart sounds: Normal heart sounds. No murmur heard.  Pulmonary:      Effort: Pulmonary effort is normal.      Breath sounds: Normal breath " sounds. No wheezing.   Abdominal:      General: Bowel sounds are normal. There is no distension.      Palpations: Abdomen is soft.      Tenderness: There is no abdominal tenderness.   Musculoskeletal:         General: No tenderness or deformity. Normal range of motion.      Cervical back: Normal range of motion and neck supple.   Lymphadenopathy:      Cervical: No cervical adenopathy.   Skin:     General: Skin is warm and dry.      Capillary Refill: Capillary refill takes less than 2 seconds.      Findings: No lesion or rash.   Neurological:      General: No focal deficit present.      Mental Status: She is alert and oriented to person, place, and time.      Gait: Gait normal.      Deep Tendon Reflexes: Reflexes normal.   Psychiatric:         Mood and Affect: Mood normal.         Behavior: Behavior normal.         Judgment: Judgment normal.        Ear Cerumen Removal    Date/Time: 2/21/2025 12:32 PM    Performed by: Coty Newton MD  Authorized by: Coty Newton MD    Anesthesia:  Local anesthetic: 0.5 mL lidocaine without epi eat ear.  Ceruminolytics applied: Ceruminolytics applied prior to the procedure.  Location details: left ear and right ear  Patient tolerance: patient tolerated the procedure well with no immediate complications  Procedure type: instrumentation, irrigation, curette   Sedation:  Patient sedated: no          The following data was reviewed by: Coty Newton MD on 02/21/2025:  2024-A1c, TSH, lipid, CMP, CBC    Assessment and Plan  Brittany Russo is a 39 y.o. female presents to [facility] today for annual exam and to discuss health goals/concerns, chart reviewed and updated, medications reviewed, labs reviewed, preventative med reviewed. Answered all questions at this time, pt expressed no further concerns today.     Preventative medicine:   Eye exam: Up to date.    Dental exam: Up to date.    BP: normal  Lipid Panel :   Ordered    A1c:  ordered    Hep C  screening: Negative  Colon cancer screening UTD- age 45-75: N/A  Lung cancer screening UTD- age 50-75: N/A  Immunizations UTD: Yes  Anxiety/depression screening: normal  Tobacco cessation counseling: N/A    Women:   Pap age 21-65: Up to date.  normal next due 2026  Mammogram age 40-75:  Scheduled    Osteoporosis Screen age 65:  N/A      Assessment & Plan  1. Health maintenance.  Her blood pressure readings are within the normal range today. She has a mammogram scheduled. She is advised to maintain a healthy lifestyle, including reducing processed food intake, increasing consumption of fruits and vegetables, ensuring adequate hydration, applying sunscreen, limiting alcohol consumption, and aiming for 6 to 8 hours of restful sleep per night. The goal is to achieve a blood pressure reading of less than 140/90. She is also advised to limit salt intake and schedule annual eye examinations. A comprehensive panel of laboratory tests will be conducted today, including cholesterol, thyroid, vitamin D, B12, and iron levels.    2. Gastroesophageal reflux disease (GERD).  She is advised to elevate the head of her bed, avoid eating after 6 PM, and identify and avoid trigger foods. Her medication regimen has been adjusted to include Protonix, to be taken 20 to 30 minutes prior to dinner, and sucralfate, to be taken 4 times daily as needed for breakthrough symptoms.  DC Prevacid due to lack of efficacy.  GI follow-up in 1 month.    3. Anxiety.  She is advised to continue counseling, meditation, and sleep hygiene practices. A prescription for hydroxyzine 10 mg (5 mL) will be provided, to be taken 30 minutes before bedtime. If effective, the dosage can be increased to 10 mL. She will discontinue her current allergy medication as hydroxyzine is an antihistamine. A refill for Pristiq will be sent to [facility] on [address].    4. Earwax impaction.  Her ears will be flushed today. She will be provided with a syringe for home use, with  instructions to flush her ears twice weekly with half-strength hydrogen peroxide. She may also use an ear softener if available at home.    5. Snoring.  Concern for sleep apnea based on patient's symptoms.  A referral to a sleep medicine specialist will be made for further evaluation and potential sleep study.    6. Intrauterine device (IUD) management.  She reports lighter menstrual flow with the IUD, with some spotting and typical cramps. No severe pain reported. No new surgeries or procedures since the IUD insertion.      Diagnoses and all orders for this visit:    1. Gastroesophageal reflux disease without esophagitis (Primary)  -     pantoprazole (Protonix) 40 MG EC tablet; Take 1 tablet by mouth Daily. Indications: Gastroesophageal Reflux Disease  Dispense: 30 tablet; Refill: 0  -     sucralfate (Carafate) 1 g tablet; Take 1 tablet by mouth 4 (Four) Times a Day Before Meals & at Bedtime As Needed (heartburn). Indications: Gastroesophageal Reflux Disease  Dispense: 90 tablet; Refill: 0    2. ANDREA (generalized anxiety disorder)    3. Mild episode of recurrent major depressive disorder  Overview:  With stable anxiety/depression, symptoms well controlled on current dose. Patient not SI/HI.  Continue current med regimen, advised continuing counseling.  AVS with nonpharmacologic supportive strategies.      Orders:  -     desvenlafaxine (PRISTIQ) 50 MG 24 hr tablet; Take 1 tablet by mouth Daily. Indications: Major Depressive Disorder  Dispense: 90 tablet; Refill: 3    4. Snoring  -     Ambulatory Referral to Sleep Medicine    5. Bilateral impacted cerumen  -     Ear Cerumen Removal    6. Moderate mixed hyperlipidemia not requiring statin therapy  -     Lipid Panel  -     Comprehensive Metabolic Panel    7. Primary hypertension  Overview:  With chronic HTN, currently Controlled on current regimen. Patient is compliant with medications.  There is no evidence of end-organ damage on exam.   - No med changes      - lab  workup: Up-to-date  - pt to make appt with optometry for dilated eye exam, check fundus for evidence of retinopathy  - Pt to measure BP on own several times weekly, at varying times of day, journaling readings to be brought in to appointments for review with physician. Reviewed correct method of home BP measurement. Goal BP < 140/90.   -  Reviewed DASH diet and HTN management in AVS    Orders:  -     Comprehensive Metabolic Panel  -     Ferritin  -     TSH Rfx On Abnormal To Free T4    8. Class 3 severe obesity due to excess calories with serious comorbidity and body mass index (BMI) of 40.0 to 44.9 in adult  -     pantoprazole (Protonix) 40 MG EC tablet; Take 1 tablet by mouth Daily. Indications: Gastroesophageal Reflux Disease  Dispense: 30 tablet; Refill: 0  -     sucralfate (Carafate) 1 g tablet; Take 1 tablet by mouth 4 (Four) Times a Day Before Meals & at Bedtime As Needed (heartburn). Indications: Gastroesophageal Reflux Disease  Dispense: 90 tablet; Refill: 0  -     Lipid Panel  -     Comprehensive Metabolic Panel  -     Hemoglobin A1c  -     Vitamin D,25-Hydroxy  -     Vitamin B12  -     Ferritin  -     TSH Rfx On Abnormal To Free T4    9. IUD (intrauterine device) in place    10. Annual physical exam        Patient voiced understanding and agreement with plan of care and had no further questions or concerns at this time.   Medical student present during appointment with patient consent; any time patient spent talking to the medical student or being examined by medical student not included in billable time.    I spent 50 minutes caring for Brittany Russo on this date of service. This time includes time spent by me in the following activities: preparing for the visit, reviewing tests, obtaining and/or reviewing a separately obtained history, performing a medically appropriate examination and/or evaluation, counseling and educating the patient/family/caregiver, ordering medications, tests, or procedures,  documenting information in the medical record, and independently interpreting results and communicating that information with the patient/family/caregiver. I spent 5 minutes on the separately reported service of ear cerumen removal. This time is not included in the time used to support the E/M service also reported today.     Problems addressed: 2 or more stable chronic illnesses (MODERATE) established problem:  worsening or not responding to treatment, established problem: inadequately controlled,  Complexity: labs ordered yes, labs reviewed yes  Risk: prescription drug management    Coty Newton MD  Family Medicine  [facility]      Follow Up  Return in about 1 year (around 2/21/2026), or if symptoms worsen or fail to improve, for Annual physical.    Patient Instructions   Today: Update screening labs.    Meds: Refill pristiq  Reflux: Protonix 40mg before dinner- wait 20-30 min   Sucralfate 4x daily as needed breakthrough heartburn    Referrals: sleep med- you should receive a call within 1 week to schedule the appointment.  If you do not hear anything please let us know.    Healthy lifestyle tips  - Reduce intake of processed food (shop perimeter of grocery store), especially white flour and sugar  - Increase intake of fruits/veggies  - Drink 32-64oz of water a day  - Use sunscreen spf 30 or higher when going outside, reapply every 2 hours  - Quit smoking if you smoke  - Drink no more than 2 alcoholic beverages/day if you are male, no more than 1 alcoholic beverage/day if you are female  - Get 6-8 hours of restful sleep at night- poor sleep quality has been linked to increased risk of cardiovascular disease  - Voluntary physical activity cumulative 120-150 minutes/week  - Stress management utilizing meditation and mindfulness (InsightTimer, free diana)  - Keep blood pressure < 140/90    Heart healthy diet from AHA:  https://www.heart.org/en/healthy-living/healthy-eating/eat-smart/nutrition-basics/aha-diet-and-lifestyle-recommendations    HEARTBURN PLAN:  Lifestyle changes: elevate the head of your bed, avoid eating after 6pm, reduce intake of foods that worsen your heartburn, avoid tight-fitting clothes around the waist  Red flags: trouble swallowing, unintentional weight loss, tarry/black bowel movements, blood in bowel movements, blood in vomit, abdominal/chest pain that does not go away, abdominal/chest pain worse after eating    HTN plan  - The symptoms of organ damage from hypertension are very subtle until there is significant damage, so prevention is key through lifestyle modifications: reduce salt intake to less than 1500mg daily (about a teaspoon), increase intake of unprocessed fresh foods, avoid processed sugar and flour, work up to 120 minutes of exercise weekly as tolerated. Consider trying the DASH diet, or Mediterranean diet  - Goal BP: <140/90  - go to the optometrist every year to monitor for retinopathy  -  Check BP every day with automatic arm cuff. Ensure BP cuff is on bare skin, you are seated with feet flat on ground and legs uncrossed, the arm is at the level of the heart, and you are not talking during the BP measurement. Keep a log of your BP and bring it to your next appointment.     Check out https://www.heart.org/en/health-topics/high-blood-pressure    MH plan:  - Start counseling if not currently established  - Meditation: check out Insight Timer (free diana)  - Sleep hygiene  - Increase physical activity as tolerated- goal 120mins/week  - Acupressure or EFT (links below)  - Supplements: magnesium, ashwaganda  - May make appt for Dr. Newton's acupuncture clinic if desired- Tuesdays, cash pay  - If you experience any thoughts of harming yourself or someone else, please go to the ER immediately.      Resources:  https://www.apa.org/topics/anxiety/    https://www.apa.org/topics/depression/    https://sleepeducation.org/healthy-sleep/healthy-sleep-habits/    https://[employer].[place]/Health/pages/conditions.aspx?ysbn=flw0670&lang=en-ca    https://www.[facility].org/cancer-care/patient-education/acupressure-stress-and-anxiety    Earwax plan:   Earwax is a normal part of the body and helps protect the delicate eardrum, but it becomes a problem when it builds up and gets stuck. You can help keep the earwax soft by flushing your ear canals 1-2x weekly in the shower with a 5mL syringe that is half hydrogen peroxide and half warm water.     Do not put qtips or other devices in the ears, as this will only push the earwax deeper into the ear canal.          Patient or patient representative verbalized consent for the use of Ambient Listening during the visit with  Coty Newton MD for chart documentation. 2/21/2025  12:38 EST

## 2025-02-21 NOTE — PATIENT INSTRUCTIONS
Today: Update screening labs.    Meds: Refill pristiq  Reflux: Protonix 40mg before dinner- wait 20-30 min   Sucralfate 4x daily as needed breakthrough heartburn    Referrals: sleep med- you should receive a call within 1 week to schedule the appointment.  If you do not hear anything please let us know.    Healthy lifestyle tips  - Reduce intake of processed food (shop perimeter of grocery store), especially white flour and sugar  - Increase intake of fruits/veggies  - Drink 32-64oz of water a day  - Use sunscreen spf 30 or higher when going outside, reapply every 2 hours  - Quit smoking if you smoke  - Drink no more than 2 alcoholic beverages/day if you are male, no more than 1 alcoholic beverage/day if you are female  - Get 6-8 hours of restful sleep at night- poor sleep quality has been linked to increased risk of cardiovascular disease  - Voluntary physical activity cumulative 120-150 minutes/week  - Stress management utilizing meditation and mindfulness (Ubiterrar, free diana)  - Keep blood pressure < 140/90    Heart healthy diet from AHA: https://www.heart.org/en/healthy-living/healthy-eating/eat-smart/nutrition-basics/aha-diet-and-lifestyle-recommendations    HEARTBURN PLAN:  Lifestyle changes: elevate the head of your bed, avoid eating after 6pm, reduce intake of foods that worsen your heartburn, avoid tight-fitting clothes around the waist  Red flags: trouble swallowing, unintentional weight loss, tarry/black bowel movements, blood in bowel movements, blood in vomit, abdominal/chest pain that does not go away, abdominal/chest pain worse after eating    HTN plan  - The symptoms of organ damage from hypertension are very subtle until there is significant damage, so prevention is key through lifestyle modifications: reduce salt intake to less than 1500mg daily (about a teaspoon), increase intake of unprocessed fresh foods, avoid processed sugar and flour, work up to 120 minutes of exercise weekly as  tolerated. Consider trying the DASH diet, or Mediterranean diet  - Goal BP: <140/90  - go to the optometrist every year to monitor for retinopathy  -  Check BP every day with automatic arm cuff. Ensure BP cuff is on bare skin, you are seated with feet flat on ground and legs uncrossed, the arm is at the level of the heart, and you are not talking during the BP measurement. Keep a log of your BP and bring it to your next appointment.     Check out https://www.heart.org/en/health-topics/high-blood-pressure    MH plan:  - Start counseling if not currently established  - Meditation: check out Insight Timer (free diana)  - Sleep hygiene  - Increase physical activity as tolerated- goal 120mins/week  - Acupressure or EFT (links below)  - Supplements: magnesium, ashwaganda  - May make appt for Dr. Newton's acupuncture clinic if desired- Tuesdays, cash pay  - If you experience any thoughts of harming yourself or someone else, please go to the ER immediately.     Resources:  https://www.apa.org/topics/anxiety/    https://www.apa.org/topics/depression/    https://sleepeducation.org/healthy-sleep/healthy-sleep-habits/    https://[facility].[place]/Health/pages/conditions.aspx?oxwm=qwr6827&lang=en-ca    https://www.[email or web address].org/cancer-care/patient-education/acupressure-stress-and-anxiety    Earwax plan:   Earwax is a normal part of the body and helps protect the delicate eardrum, but it becomes a problem when it builds up and gets stuck. You can help keep the earwax soft by flushing your ear canals 1-2x weekly in the shower with a 5mL syringe that is half hydrogen peroxide and half warm water.     Do not put qtips or other devices in the ears, as this will only push the earwax deeper into the ear canal.

## 2025-02-22 LAB
25(OH)D3+25(OH)D2 SERPL-MCNC: 13.3 NG/ML (ref 30–100)
ALBUMIN SERPL-MCNC: 4.1 G/DL (ref 3.9–4.9)
ALP SERPL-CCNC: 104 IU/L (ref 44–121)
ALT SERPL-CCNC: 24 IU/L (ref 0–32)
AST SERPL-CCNC: 20 IU/L (ref 0–40)
BILIRUB SERPL-MCNC: <0.2 MG/DL (ref 0–1.2)
BUN SERPL-MCNC: 9 MG/DL (ref 6–24)
BUN/CREAT SERPL: 15 (ref 9–23)
CALCIUM SERPL-MCNC: 9.7 MG/DL (ref 8.7–10.2)
CHLORIDE SERPL-SCNC: 103 MMOL/L (ref 96–106)
CHOLEST SERPL-MCNC: 291 MG/DL (ref 100–199)
CO2 SERPL-SCNC: 23 MMOL/L (ref 20–29)
CREAT SERPL-MCNC: 0.6 MG/DL (ref 0.57–1)
EGFRCR SERPLBLD CKD-EPI 2021: 116 ML/MIN/1.73
FERRITIN SERPL-MCNC: 53 NG/ML (ref 15–150)
GLOBULIN SER CALC-MCNC: 2.9 G/DL (ref 1.5–4.5)
GLUCOSE SERPL-MCNC: 94 MG/DL (ref 70–99)
HBA1C MFR BLD: 5.8 % (ref 4.8–5.6)
HDLC SERPL-MCNC: 55 MG/DL
LDL CALC COMMENT:: ABNORMAL
LDLC SERPL CALC-MCNC: 201 MG/DL (ref 0–99)
POTASSIUM SERPL-SCNC: 4.6 MMOL/L (ref 3.5–5.2)
PROT SERPL-MCNC: 7 G/DL (ref 6–8.5)
SODIUM SERPL-SCNC: 143 MMOL/L (ref 134–144)
TRIGL SERPL-MCNC: 187 MG/DL (ref 0–149)
TSH SERPL DL<=0.005 MIU/L-ACNC: 1.16 UIU/ML (ref 0.45–4.5)
VIT B12 SERPL-MCNC: 306 PG/ML (ref 232–1245)
VLDLC SERPL CALC-MCNC: 35 MG/DL (ref 5–40)

## 2025-02-22 NOTE — PROGRESS NOTES
Hello!    Here are the results of your most recent labs:    Your iron, Comprehensive Metabolic Panel, and Thyroid Function was all normal.     B12 is 306, this is in normal limits some of our neurology colleagues recommend a B12 level of 500-900.  If you are not taking a multivitamin with B12 in it I recommend starting.    Your vitamin D, Cholesterol, and Hgb A1C was abnormal.     I recommend supplementing with OTC vitamin D 1000 IU daily for 3-6 months, then take 400 IU daily or a prenatal vitamin.     Your cholesterol was elevated.  For diet and lifestyle intervention, I recommend decreasing intake of trans and saturated fats, and red meat.  Increase physical activity as tolerated.  You may try supplementing with omega-3 1-2g daily, berberine 500mg daily, and/or whole flaxseed if desired.    ASCVD risk: The 10-year ASCVD risk score (Mary CHANEY, et al., 2019) is: 1.6%    Values used to calculate the score:      Age: 39 years      Sex: Female      Is Non- : No      Diabetic: No      Tobacco smoker: No      Systolic Blood Pressure: 126 mmHg      Is BP treated: Yes      HDL Cholesterol: 55 mg/dL      Total Cholesterol: 291 mg/dL      At this time a statin is not recommended.     Your A1c was in the prediabetic range; if it goes over 6.5 you will have type 2 diabetes. I recommend decreasing carbohydrate intake to 150-200 g/day, reducing processed food, increasing fruit and vegetable intake, at least 120 minutes of physical activity per week as tolerated, and controlling blood pressure with a goal of less than 120/80.     Please continue your current medications.  Please contact me with any questions.    Thank you!  Dr. Newton

## 2025-03-05 ENCOUNTER — HOSPITAL ENCOUNTER (OUTPATIENT)
Dept: MAMMOGRAPHY | Facility: HOSPITAL | Age: 40
Discharge: HOME | End: 2025-03-05
Admitting: FAMILY MEDICINE
Payer: COMMERCIAL

## 2025-03-05 DIAGNOSIS — Z12.31 ENCOUNTER FOR SCREENING MAMMOGRAM FOR MALIGNANT NEOPLASM OF BREAST: ICD-10-CM

## 2025-03-05 PROCEDURE — 77063 BREAST TOMOSYNTHESIS BI: CPT

## 2025-03-05 PROCEDURE — 77067 SCR MAMMO BI INCL CAD: CPT

## 2025-03-18 ENCOUNTER — OFFICE VISIT (OUTPATIENT)
Dept: GASTROENTEROLOGY | Facility: CLINIC | Age: 40
End: 2025-03-18
Payer: COMMERCIAL

## 2025-03-18 ENCOUNTER — TELEPHONE (OUTPATIENT)
Dept: GASTROENTEROLOGY | Facility: CLINIC | Age: 40
End: 2025-03-18
Payer: COMMERCIAL

## 2025-03-18 VITALS
DIASTOLIC BLOOD PRESSURE: 87 MMHG | HEIGHT: 63 IN | WEIGHT: 227.4 LBS | BODY MASS INDEX: 40.29 KG/M2 | SYSTOLIC BLOOD PRESSURE: 136 MMHG | HEART RATE: 81 BPM | TEMPERATURE: 97.2 F

## 2025-03-18 DIAGNOSIS — R10.13 DYSPEPSIA: Primary | ICD-10-CM

## 2025-03-18 DIAGNOSIS — K21.9 GASTROESOPHAGEAL REFLUX DISEASE WITHOUT ESOPHAGITIS: ICD-10-CM

## 2025-03-18 DIAGNOSIS — R13.10 DYSPHAGIA, UNSPECIFIED TYPE: ICD-10-CM

## 2025-03-18 DIAGNOSIS — K52.9 CHRONIC DIARRHEA: ICD-10-CM

## 2025-03-18 DIAGNOSIS — E66.01 CLASS 3 SEVERE OBESITY DUE TO EXCESS CALORIES WITH SERIOUS COMORBIDITY AND BODY MASS INDEX (BMI) OF 40.0 TO 44.9 IN ADULT: ICD-10-CM

## 2025-03-18 DIAGNOSIS — K62.5 RECTAL BLEEDING: ICD-10-CM

## 2025-03-18 DIAGNOSIS — E66.813 CLASS 3 SEVERE OBESITY DUE TO EXCESS CALORIES WITH SERIOUS COMORBIDITY AND BODY MASS INDEX (BMI) OF 40.0 TO 44.9 IN ADULT: ICD-10-CM

## 2025-03-18 LAB
ERYTHROCYTE [DISTWIDTH] IN BLOOD BY AUTOMATED COUNT: 12.9 % (ref 12.3–15.4)
HCT VFR BLD AUTO: 41.2 % (ref 34–46.6)
HGB BLD-MCNC: 13.3 G/DL (ref 12–15.9)
MCH RBC QN AUTO: 28.4 PG (ref 26.6–33)
MCHC RBC AUTO-ENTMCNC: 32.3 G/DL (ref 31.5–35.7)
MCV RBC AUTO: 87.8 FL (ref 79–97)
PLATELET # BLD AUTO: 283 10*3/MM3 (ref 140–450)
RBC # BLD AUTO: 4.69 10*6/MM3 (ref 3.77–5.28)
WBC # BLD AUTO: 7.46 10*3/MM3 (ref 3.4–10.8)

## 2025-03-18 PROCEDURE — 3075F SYST BP GE 130 - 139MM HG: CPT | Performed by: NURSE PRACTITIONER

## 2025-03-18 PROCEDURE — 99214 OFFICE O/P EST MOD 30 MIN: CPT | Performed by: NURSE PRACTITIONER

## 2025-03-18 PROCEDURE — 3079F DIAST BP 80-89 MM HG: CPT | Performed by: NURSE PRACTITIONER

## 2025-03-18 RX ORDER — PANTOPRAZOLE SODIUM 40 MG/1
40 TABLET, DELAYED RELEASE ORAL DAILY
Qty: 30 TABLET | Refills: 0 | Status: SHIPPED | OUTPATIENT
Start: 2025-03-18

## 2025-03-18 RX ORDER — NORTRIPTYLINE HYDROCHLORIDE 10 MG/1
10 CAPSULE ORAL NIGHTLY
Qty: 90 CAPSULE | Refills: 3 | Status: SHIPPED | OUTPATIENT
Start: 2025-03-18

## 2025-03-18 NOTE — TELEPHONE ENCOUNTER
LULY Vasques  for COLONOSCOPY on 6/17/25  arrive at 1:00  . Gave prep instructions to pt in office ....miralax

## 2025-03-18 NOTE — PROGRESS NOTES
Chief Complaint   Patient presents with    Dyspepsia       HPI    Brittany Russo is a  39 y.o. female here new over 3-year patient for dyspepsia.    This patient will follow with Dr. Romero myself.    Past medical history of anxiety, depression, hiatal hernia, hypertension along with scoliosis.    On visit today she reports worsening of baseline dyspeptic symptoms over the last several months.  She has a longstanding history of GERD.  She had been doing well on lansoprazole until she started developing breakthrough.  Her primary care provider placed her on pantoprazole 40 mg once daily along with Carafate with some improvement in symptoms but not complete resolution.  She reports solid food dysphagia which comes and goes infrequently.  Denies regurgitation.  No nausea, vomiting, poor appetite or weight loss.  She tries to follow an antireflux diet.    She has a longstanding history of IBS.  Diarrhea predominant.  Has up to 5 bowel movements a day on average depending on stress and dietary triggers.  Reports episodes of rectal bleeding with bright red blood on the toilet paper.  She was on nortriptyline many years ago and found it quite helpful.  She would like to resume this.    Recent lab work with normal TSH and LFTs.     She reports having an EGD in  recounts being told she had a hiatal hernia.  Colonoscopy about the same time cannot recall results.    No family history of colon cancer    Past Medical History:   Diagnosis Date    Allergic     Anxiety     Depression     GERD (gastroesophageal reflux disease)     Heart murmur     Hiatal hernia     Hypertension 20 years ago    Initial patient encounter 2020    Irritable bowel syndrome     Normal pregnancy 2020    Scoliosis        Past Surgical History:   Procedure Laterality Date     SECTION  2017 and 3-3-2021    COLONOSCOPY  approx     pt does not recall results     INTRAUTERINE DEVICE INSERTION  2025    Mirena     TONSILLECTOMY AND ADENOIDECTOMY      UPPER GASTROINTESTINAL ENDOSCOPY  approx 2008    patient does not recall results     UVULECTOMY      WISDOM TOOTH EXTRACTION         Scheduled Meds:     Continuous Infusions: No current facility-administered medications for this visit.      PRN Meds:     Allergies   Allergen Reactions    Cephalexin Shortness Of Breath    Hydrocodone Hives, Itching, Nausea And Vomiting and Rash    Oxycodone Itching and Nausea And Vomiting    Penicillins Rash       Social History     Socioeconomic History    Marital status:    Tobacco Use    Smoking status: Never    Smokeless tobacco: Never   Vaping Use    Vaping status: Never Used   Substance and Sexual Activity    Alcohol use: No    Drug use: No    Sexual activity: Yes     Partners: Male     Birth control/protection: I.U.D.       Family History   Problem Relation Age of Onset    Memory loss Mother     Hyperlipidemia Father     Diabetes type II Father     Hypertension Father     Irritable bowel syndrome Father     Early death Sister     Stroke Sister     Factor V Leiden deficiency Sister     Hypertension Brother     Hypertension Brother     Dementia Maternal Grandmother     Stroke Maternal Grandfather 70    Diabetes type I Nephew     Breast cancer Neg Hx     Colon cancer Neg Hx        Review of Systems   Gastrointestinal:  Positive for anal bleeding and diarrhea.       Vitals:    03/18/25 0902   BP: 136/87   Pulse: 81   Temp: 97.2 °F (36.2 °C)       Physical Exam  Constitutional:       Appearance: She is well-developed.   Abdominal:      General: Bowel sounds are normal. There is no distension.      Palpations: Abdomen is soft. There is no mass.      Tenderness: There is no abdominal tenderness. There is no guarding.      Hernia: No hernia is present.   Skin:     General: Skin is warm and dry.      Capillary Refill: Capillary refill takes less than 2 seconds.   Neurological:      Mental Status: She is alert and oriented to person, place,  and time.   Psychiatric:         Behavior: Behavior normal.     Assessment    Diagnoses and all orders for this visit:    1. Dyspepsia (Primary)  -     Case Request; Standing  -     Implement Anesthesia orders day of procedure.; Standing  -     Follow Anesthesia Guidelines / Protocol; Future  -     Verify bowel prep was successful; Standing  -     Give tap water enema if bowel prep was insufficient; Standing  -     Case Request    2. Dysphagia, unspecified type  -     Case Request; Standing  -     Implement Anesthesia orders day of procedure.; Standing  -     Follow Anesthesia Guidelines / Protocol; Future  -     Verify bowel prep was successful; Standing  -     Give tap water enema if bowel prep was insufficient; Standing  -     Case Request    3. Rectal bleeding  -     Case Request; Standing  -     Implement Anesthesia orders day of procedure.; Standing  -     Follow Anesthesia Guidelines / Protocol; Future  -     Verify bowel prep was successful; Standing  -     Give tap water enema if bowel prep was insufficient; Standing  -     Case Request  -     CBC (No Diff)    4. Chronic diarrhea  -     Case Request; Standing  -     Implement Anesthesia orders day of procedure.; Standing  -     Follow Anesthesia Guidelines / Protocol; Future  -     Verify bowel prep was successful; Standing  -     Give tap water enema if bowel prep was insufficient; Standing  -     Case Request  -     CBC (No Diff)  -     Celiac Comprehensive Panel    Other orders  -     nortriptyline (Pamelor) 10 MG capsule; Take 1 capsule by mouth Every Night. Treatment for irritable bowel syndrome  Dispense: 90 capsule; Refill: 3    Plan    Schedule EGD and colonoscopy with Dr. Romero  Resume nortriptyline as it helped years ago with diarrhea  CBC and celiac comprehensive panel today  Continue PPI and Carafate  Follow an antireflux diet  Await endoscopic findings for further recommendations and follow-up         LYNN Maldonado  [facility]  [address] [place] [address]  Office: [phone]

## 2025-03-19 LAB
ENDOMYSIUM IGA SER QL: NEGATIVE
GLIADIN PEPTIDE IGA SER-ACNC: 3 UNITS (ref 0–19)
GLIADIN PEPTIDE IGG SER-ACNC: 2 UNITS (ref 0–19)
IGA SERPL-MCNC: 173 MG/DL (ref 87–352)
TTG IGA SER-ACNC: <2 U/ML (ref 0–3)
TTG IGG SER-ACNC: 3 U/ML (ref 0–5)

## 2025-03-21 DIAGNOSIS — E66.01 CLASS 3 SEVERE OBESITY DUE TO EXCESS CALORIES WITH SERIOUS COMORBIDITY AND BODY MASS INDEX (BMI) OF 40.0 TO 44.9 IN ADULT: ICD-10-CM

## 2025-03-21 DIAGNOSIS — E66.813 CLASS 3 SEVERE OBESITY DUE TO EXCESS CALORIES WITH SERIOUS COMORBIDITY AND BODY MASS INDEX (BMI) OF 40.0 TO 44.9 IN ADULT: ICD-10-CM

## 2025-03-21 DIAGNOSIS — K21.9 GASTROESOPHAGEAL REFLUX DISEASE WITHOUT ESOPHAGITIS: ICD-10-CM

## 2025-03-24 RX ORDER — SUCRALFATE 1 G/1
TABLET ORAL
Qty: 90 TABLET | Refills: 0 | Status: SHIPPED | OUTPATIENT
Start: 2025-03-24

## 2025-03-24 NOTE — TELEPHONE ENCOUNTER
Rx Refill Note  Requested Prescriptions     Pending Prescriptions Disp Refills    sucralfate (CARAFATE) 1 g tablet [Pharmacy Med Name: SUCRALFATE 1GM TABLETS] 90 tablet 0     Sig: TAKE 1 TABLET BY MOUTH FOUR TIMES DAILY BEFORE MEALS AND AT BEDTIME AS NEEDED FOR HEARTBURN OR GERD      Last office visit with prescribing clinician: 2/21/2025   Last telemedicine visit with prescribing clinician: Visit date not found   Next office visit with prescribing clinician: 2/23/2026       Noy Walton  03/24/25, 08:00 EDT

## 2025-04-20 ENCOUNTER — HOSPITAL ENCOUNTER (OUTPATIENT)
Facility: HOSPITAL | Age: 40
Discharge: HOME | End: 2025-04-21
Attending: EMERGENCY MEDICINE | Admitting: STUDENT IN AN ORGANIZED HEALTH CARE EDUCATION/TRAINING PROGRAM
Payer: COMMERCIAL

## 2025-04-20 ENCOUNTER — APPOINTMENT (OUTPATIENT)
Dept: CT IMAGING | Facility: HOSPITAL | Age: 40
End: 2025-04-20
Payer: COMMERCIAL

## 2025-04-20 DIAGNOSIS — N20.1 RIGHT URETERAL STONE: Primary | ICD-10-CM

## 2025-04-20 DIAGNOSIS — K80.20 CALCULUS OF GALLBLADDER WITHOUT CHOLECYSTITIS WITHOUT OBSTRUCTION: ICD-10-CM

## 2025-04-20 DIAGNOSIS — I10 HYPERTENSION NOT AT GOAL: ICD-10-CM

## 2025-04-20 DIAGNOSIS — N20.0 KIDNEY STONE: ICD-10-CM

## 2025-04-20 LAB
ALBUMIN SERPL-MCNC: 4.2 G/DL (ref 3.5–5.2)
ALBUMIN/GLOB SERPL: 1.3 G/DL
ALP SERPL-CCNC: 113 U/L (ref 39–117)
ALT SERPL W P-5'-P-CCNC: 20 U/L (ref 1–33)
ANION GAP SERPL CALCULATED.3IONS-SCNC: 9 MMOL/L (ref 5–15)
AST SERPL-CCNC: 16 U/L (ref 1–32)
BACTERIA UR QL AUTO: ABNORMAL /HPF
BASOPHILS # BLD AUTO: 0.05 10*3/MM3 (ref 0–0.2)
BASOPHILS NFR BLD AUTO: 0.6 % (ref 0–1.5)
BILIRUB SERPL-MCNC: 0.2 MG/DL (ref 0–1.2)
BILIRUB UR QL STRIP: NEGATIVE
BUN SERPL-MCNC: 10 MG/DL (ref 6–20)
BUN/CREAT SERPL: 11.9 (ref 7–25)
CALCIUM SPEC-SCNC: 9.6 MG/DL (ref 8.6–10.5)
CHLORIDE SERPL-SCNC: 104 MMOL/L (ref 98–107)
CLARITY UR: ABNORMAL
CO2 SERPL-SCNC: 26 MMOL/L (ref 22–29)
COD CRY URNS QL: PRESENT /HPF
COLOR UR: YELLOW
CREAT SERPL-MCNC: 0.84 MG/DL (ref 0.57–1)
DEPRECATED RDW RBC AUTO: 42.4 FL (ref 37–54)
EGFRCR SERPLBLD CKD-EPI 2021: 90.2 ML/MIN/1.73
EOSINOPHIL # BLD AUTO: 0.18 10*3/MM3 (ref 0–0.4)
EOSINOPHIL NFR BLD AUTO: 2.3 % (ref 0.3–6.2)
ERYTHROCYTE [DISTWIDTH] IN BLOOD BY AUTOMATED COUNT: 13.2 % (ref 12.3–15.4)
GLOBULIN UR ELPH-MCNC: 3.3 GM/DL
GLUCOSE SERPL-MCNC: 104 MG/DL (ref 65–99)
GLUCOSE UR STRIP-MCNC: NEGATIVE MG/DL
HCG SERPL QL: NEGATIVE
HCT VFR BLD AUTO: 41.1 % (ref 34–46.6)
HGB BLD-MCNC: 13.5 G/DL (ref 12–15.9)
HGB UR QL STRIP.AUTO: ABNORMAL
HYALINE CASTS UR QL AUTO: ABNORMAL /LPF
IMM GRANULOCYTES # BLD AUTO: 0.01 10*3/MM3 (ref 0–0.05)
IMM GRANULOCYTES NFR BLD AUTO: 0.1 % (ref 0–0.5)
KETONES UR QL STRIP: ABNORMAL
LEUKOCYTE ESTERASE UR QL STRIP.AUTO: ABNORMAL
LYMPHOCYTES # BLD AUTO: 1.81 10*3/MM3 (ref 0.7–3.1)
LYMPHOCYTES NFR BLD AUTO: 23.4 % (ref 19.6–45.3)
MCH RBC QN AUTO: 28.8 PG (ref 26.6–33)
MCHC RBC AUTO-ENTMCNC: 32.8 G/DL (ref 31.5–35.7)
MCV RBC AUTO: 87.8 FL (ref 79–97)
MONOCYTES # BLD AUTO: 0.46 10*3/MM3 (ref 0.1–0.9)
MONOCYTES NFR BLD AUTO: 5.9 % (ref 5–12)
NEUTROPHILS NFR BLD AUTO: 5.24 10*3/MM3 (ref 1.7–7)
NEUTROPHILS NFR BLD AUTO: 67.7 % (ref 42.7–76)
NITRITE UR QL STRIP: NEGATIVE
NRBC BLD AUTO-RTO: 0 /100 WBC (ref 0–0.2)
PH UR STRIP.AUTO: 6 [PH] (ref 5–8)
PLATELET # BLD AUTO: 290 10*3/MM3 (ref 140–450)
PMV BLD AUTO: 10.1 FL (ref 6–12)
POTASSIUM SERPL-SCNC: 3.8 MMOL/L (ref 3.5–5.2)
PROT SERPL-MCNC: 7.5 G/DL (ref 6–8.5)
PROT UR QL STRIP: ABNORMAL
RBC # BLD AUTO: 4.68 10*6/MM3 (ref 3.77–5.28)
RBC # UR STRIP: ABNORMAL /HPF
REF LAB TEST METHOD: ABNORMAL
SODIUM SERPL-SCNC: 139 MMOL/L (ref 136–145)
SP GR UR STRIP: 1.02 (ref 1–1.03)
SQUAMOUS #/AREA URNS HPF: ABNORMAL /HPF
UROBILINOGEN UR QL STRIP: ABNORMAL
WBC # UR STRIP: ABNORMAL /HPF
WBC NRBC COR # BLD AUTO: 7.75 10*3/MM3 (ref 3.4–10.8)
YEAST URNS QL MICRO: PRESENT /HPF

## 2025-04-20 PROCEDURE — 96375 TX/PRO/DX INJ NEW DRUG ADDON: CPT

## 2025-04-20 PROCEDURE — 25810000003 SODIUM CHLORIDE 0.9 % SOLUTION 250 ML FLEX CONT: Performed by: EMERGENCY MEDICINE

## 2025-04-20 PROCEDURE — 25010000002 ONDANSETRON PER 1 MG: Performed by: EMERGENCY MEDICINE

## 2025-04-20 PROCEDURE — 80053 COMPREHEN METABOLIC PANEL: CPT | Performed by: EMERGENCY MEDICINE

## 2025-04-20 PROCEDURE — 25010000002 HYDROMORPHONE PER 4 MG: Performed by: EMERGENCY MEDICINE

## 2025-04-20 PROCEDURE — G0378 HOSPITAL OBSERVATION PER HR: HCPCS

## 2025-04-20 PROCEDURE — 99285 EMERGENCY DEPT VISIT HI MDM: CPT

## 2025-04-20 PROCEDURE — 96376 TX/PRO/DX INJ SAME DRUG ADON: CPT

## 2025-04-20 PROCEDURE — 25010000002 PROCHLORPERAZINE 10 MG/2ML SOLUTION: Performed by: NURSE PRACTITIONER

## 2025-04-20 PROCEDURE — 25010000002 HYDROMORPHONE 1 MG/ML SOLUTION: Performed by: EMERGENCY MEDICINE

## 2025-04-20 PROCEDURE — 25010000002 MORPHINE PER 10 MG: Performed by: NURSE PRACTITIONER

## 2025-04-20 PROCEDURE — 25010000002 LIDOCAINE 1 % SOLUTION 10 ML VIAL: Performed by: EMERGENCY MEDICINE

## 2025-04-20 PROCEDURE — 25010000002 ONDANSETRON PER 1 MG: Performed by: NURSE PRACTITIONER

## 2025-04-20 PROCEDURE — 84703 CHORIONIC GONADOTROPIN ASSAY: CPT | Performed by: EMERGENCY MEDICINE

## 2025-04-20 PROCEDURE — 25810000003 LACTATED RINGERS SOLUTION: Performed by: EMERGENCY MEDICINE

## 2025-04-20 PROCEDURE — 81001 URINALYSIS AUTO W/SCOPE: CPT | Performed by: EMERGENCY MEDICINE

## 2025-04-20 PROCEDURE — 96374 THER/PROPH/DIAG INJ IV PUSH: CPT

## 2025-04-20 PROCEDURE — 74176 CT ABD & PELVIS W/O CONTRAST: CPT

## 2025-04-20 PROCEDURE — 36415 COLL VENOUS BLD VENIPUNCTURE: CPT

## 2025-04-20 PROCEDURE — 25010000002 HYDROMORPHONE 1 MG/ML SOLUTION: Performed by: NURSE PRACTITIONER

## 2025-04-20 PROCEDURE — 85025 COMPLETE CBC W/AUTO DIFF WBC: CPT | Performed by: EMERGENCY MEDICINE

## 2025-04-20 RX ORDER — ONDANSETRON 2 MG/ML
4 INJECTION INTRAMUSCULAR; INTRAVENOUS EVERY 6 HOURS PRN
Status: DISCONTINUED | OUTPATIENT
Start: 2025-04-20 | End: 2025-04-21 | Stop reason: HOSPADM

## 2025-04-20 RX ORDER — SODIUM CHLORIDE 0.9 % (FLUSH) 0.9 %
10 SYRINGE (ML) INJECTION AS NEEDED
Status: DISCONTINUED | OUTPATIENT
Start: 2025-04-20 | End: 2025-04-21 | Stop reason: HOSPADM

## 2025-04-20 RX ORDER — MORPHINE SULFATE 2 MG/ML
4 INJECTION, SOLUTION INTRAMUSCULAR; INTRAVENOUS
Status: DISCONTINUED | OUTPATIENT
Start: 2025-04-20 | End: 2025-04-20

## 2025-04-20 RX ORDER — ONDANSETRON 4 MG/1
4 TABLET, ORALLY DISINTEGRATING ORAL EVERY 6 HOURS PRN
Status: DISCONTINUED | OUTPATIENT
Start: 2025-04-20 | End: 2025-04-21 | Stop reason: HOSPADM

## 2025-04-20 RX ORDER — SODIUM CHLORIDE 9 MG/ML
40 INJECTION, SOLUTION INTRAVENOUS AS NEEDED
Status: DISCONTINUED | OUTPATIENT
Start: 2025-04-20 | End: 2025-04-21 | Stop reason: HOSPADM

## 2025-04-20 RX ORDER — DESVENLAFAXINE 50 MG/1
50 TABLET, FILM COATED, EXTENDED RELEASE ORAL DAILY
Status: DISCONTINUED | OUTPATIENT
Start: 2025-04-21 | End: 2025-04-20

## 2025-04-20 RX ORDER — DESVENLAFAXINE 50 MG/1
50 TABLET, FILM COATED, EXTENDED RELEASE ORAL NIGHTLY
Status: DISCONTINUED | OUTPATIENT
Start: 2025-04-20 | End: 2025-04-21 | Stop reason: HOSPADM

## 2025-04-20 RX ORDER — SODIUM CHLORIDE 0.9 % (FLUSH) 0.9 %
10 SYRINGE (ML) INJECTION EVERY 12 HOURS SCHEDULED
Status: DISCONTINUED | OUTPATIENT
Start: 2025-04-20 | End: 2025-04-21 | Stop reason: HOSPADM

## 2025-04-20 RX ORDER — VALSARTAN 80 MG/1
80 TABLET ORAL NIGHTLY
Status: DISCONTINUED | OUTPATIENT
Start: 2025-04-20 | End: 2025-04-21 | Stop reason: HOSPADM

## 2025-04-20 RX ORDER — PROCHLORPERAZINE EDISYLATE 5 MG/ML
5 INJECTION INTRAMUSCULAR; INTRAVENOUS EVERY 4 HOURS PRN
Status: DISCONTINUED | OUTPATIENT
Start: 2025-04-20 | End: 2025-04-21 | Stop reason: HOSPADM

## 2025-04-20 RX ORDER — SUCRALFATE 1 G/1
1 TABLET ORAL
Status: DISCONTINUED | OUTPATIENT
Start: 2025-04-20 | End: 2025-04-21 | Stop reason: HOSPADM

## 2025-04-20 RX ORDER — HYDROMORPHONE HYDROCHLORIDE 1 MG/ML
0.5 INJECTION, SOLUTION INTRAMUSCULAR; INTRAVENOUS; SUBCUTANEOUS ONCE
Refills: 0 | Status: COMPLETED | OUTPATIENT
Start: 2025-04-20 | End: 2025-04-20

## 2025-04-20 RX ORDER — VALSARTAN 80 MG/1
80 TABLET ORAL DAILY
Status: DISCONTINUED | OUTPATIENT
Start: 2025-04-21 | End: 2025-04-20

## 2025-04-20 RX ORDER — ONDANSETRON 2 MG/ML
4 INJECTION INTRAMUSCULAR; INTRAVENOUS ONCE
Status: COMPLETED | OUTPATIENT
Start: 2025-04-20 | End: 2025-04-20

## 2025-04-20 RX ORDER — PANTOPRAZOLE SODIUM 40 MG/1
40 TABLET, DELAYED RELEASE ORAL NIGHTLY
Status: DISCONTINUED | OUTPATIENT
Start: 2025-04-20 | End: 2025-04-21 | Stop reason: HOSPADM

## 2025-04-20 RX ORDER — NORTRIPTYLINE HYDROCHLORIDE 10 MG/1
10 CAPSULE ORAL NIGHTLY
Status: DISCONTINUED | OUTPATIENT
Start: 2025-04-20 | End: 2025-04-21 | Stop reason: HOSPADM

## 2025-04-20 RX ORDER — PANTOPRAZOLE SODIUM 40 MG/1
40 TABLET, DELAYED RELEASE ORAL
Status: DISCONTINUED | OUTPATIENT
Start: 2025-04-21 | End: 2025-04-20

## 2025-04-20 RX ADMIN — HYDROMORPHONE HYDROCHLORIDE 1 MG: 1 INJECTION, SOLUTION INTRAMUSCULAR; INTRAVENOUS; SUBCUTANEOUS at 21:41

## 2025-04-20 RX ADMIN — HYDROMORPHONE HYDROCHLORIDE 0.5 MG: 1 INJECTION, SOLUTION INTRAMUSCULAR; INTRAVENOUS; SUBCUTANEOUS at 17:43

## 2025-04-20 RX ADMIN — Medication 10 ML: at 21:41

## 2025-04-20 RX ADMIN — SODIUM CHLORIDE, POTASSIUM CHLORIDE, SODIUM LACTATE AND CALCIUM CHLORIDE 1000 ML: 600; 310; 30; 20 INJECTION, SOLUTION INTRAVENOUS at 17:46

## 2025-04-20 RX ADMIN — DESVENLAFAXINE 50 MG: 50 TABLET, EXTENDED RELEASE ORAL at 23:02

## 2025-04-20 RX ADMIN — PANTOPRAZOLE SODIUM 40 MG: 40 TABLET, DELAYED RELEASE ORAL at 21:40

## 2025-04-20 RX ADMIN — PROCHLORPERAZINE EDISYLATE 5 MG: 5 INJECTION, SOLUTION INTRAMUSCULAR; INTRAVENOUS at 21:40

## 2025-04-20 RX ADMIN — ONDANSETRON 4 MG: 2 INJECTION, SOLUTION INTRAMUSCULAR; INTRAVENOUS at 23:52

## 2025-04-20 RX ADMIN — LIDOCAINE HYDROCHLORIDE 125 MG: 10 INJECTION, SOLUTION INFILTRATION; PERINEURAL at 19:11

## 2025-04-20 RX ADMIN — MORPHINE SULFATE 4 MG: 2 INJECTION, SOLUTION INTRAMUSCULAR; INTRAVENOUS at 20:44

## 2025-04-20 RX ADMIN — ONDANSETRON 4 MG: 2 INJECTION, SOLUTION INTRAMUSCULAR; INTRAVENOUS at 17:45

## 2025-04-20 RX ADMIN — VALSARTAN 80 MG: 80 TABLET ORAL at 23:02

## 2025-04-20 RX ADMIN — Medication 10 ML: at 20:44

## 2025-04-20 RX ADMIN — HYDROMORPHONE HYDROCHLORIDE 1 MG: 1 INJECTION, SOLUTION INTRAMUSCULAR; INTRAVENOUS; SUBCUTANEOUS at 18:17

## 2025-04-20 RX ADMIN — HYDROMORPHONE HYDROCHLORIDE 1 MG: 1 INJECTION, SOLUTION INTRAMUSCULAR; INTRAVENOUS; SUBCUTANEOUS at 23:52

## 2025-04-20 RX ADMIN — NORTRIPTYLINE HYDROCHLORIDE 10 MG: 10 CAPSULE ORAL at 23:01

## 2025-04-20 RX ADMIN — Medication 10 ML: at 23:52

## 2025-04-20 RX ADMIN — SUCRALFATE 1 G: 1 TABLET ORAL at 21:40

## 2025-04-20 NOTE — ED PROVIDER NOTES
EMERGENCY DEPARTMENT ENCOUNTER  Room Number:  101/1  PCP: Coty Newton MD  Independent Historians: Patient      HPI:  Chief Complaint: had concerns including Flank Pain.  And right lower quadrant pain    A complete HPI/ROS/PMH/PSH/SH/FH are unobtainable due to: None    Chronic or social conditions impacting patient care (Social Determinants of Health): None      Context: Brittany Russo is a 39 y.o. female with a medical history of dyspepsia who presents to the ED c/o acute severe pain in the right flank and right lower abdomen area that began rather suddenly this evening.  Patient says the pain radiates from the right lower quadrant up towards the right side and flank area.  She has had some nausea with the pain.  Denies recent hematuria.  Denies diarrhea.  Denies blood per rectum.  Denies cough or cold or flulike symptoms.  Has an IUD and says that her menstrual cycles are consequently irregular      Review of prior external notes (non-ED) -and- Review of prior external test results outside of this encounter: I independently reviewed the gastroenterology office progress note from March 18, 2025.  Patient had consultation for management of dyspepsia, dysphagia and rectal bleeding as well as chronic diarrhea.  Multiple diagnostic tests were planned.  Patient prescribed Pamelor 10 mg capsule.  Plan was to schedule EGD and colonoscopy    Prescription drug monitoring program review: YIN reviewed by Aman Pressley MD, Morgan Bradley MD       PAST MEDICAL HISTORY  Active Ambulatory Problems     Diagnosis Date Noted    IBS (irritable bowel syndrome) 02/12/2019    Gastroesophageal reflux disease without esophagitis 02/12/2019    Depression 07/29/2020    ANDREA (generalized anxiety disorder) 09/05/2019    Heart murmur 04/02/2021    Hemorrhoids, external 05/24/2013    Hypertension 04/02/2021    Migraine headache without aura 02/26/2024    History of pre-eclampsia 02/26/2024    Family history of stroke due to  Yes    aneurysm 2024    Family history of bleeding or clotting disorder 2024    Snoring 2025    IUD (intrauterine device) in place 2025    Dyspepsia 2025    Dysphagia 2025    Rectal bleeding 2025    Chronic diarrhea 2025     Resolved Ambulatory Problems     Diagnosis Date Noted    Initial patient encounter 2020    Class 1 obesity with body mass index (BMI) of 34.0 to 34.9 in adult 2019    Normal pregnancy 2020     Past Medical History:   Diagnosis Date    Allergic     Anxiety     GERD (gastroesophageal reflux disease)     Hiatal hernia     Irritable bowel syndrome     Scoliosis          PAST SURGICAL HISTORY  Past Surgical History:   Procedure Laterality Date     SECTION  2017 and 3-3-2021    COLONOSCOPY  approx     pt does not recall results     INTRAUTERINE DEVICE INSERTION  2025    Mirena    TONSILLECTOMY AND ADENOIDECTOMY      UPPER GASTROINTESTINAL ENDOSCOPY  approx     patient does not recall results     UVULECTOMY      WISDOM TOOTH EXTRACTION           FAMILY HISTORY  Family History   Problem Relation Age of Onset    Memory loss Mother     Hyperlipidemia Father     Diabetes type II Father     Hypertension Father     Irritable bowel syndrome Father     Early death Sister     Stroke Sister     Factor V Leiden deficiency Sister     Hypertension Brother     Hypertension Brother     Dementia Maternal Grandmother     Stroke Maternal Grandfather 70    Diabetes type I Nephew     Breast cancer Neg Hx     Colon cancer Neg Hx          SOCIAL HISTORY  Social History     Socioeconomic History    Marital status:    Tobacco Use    Smoking status: Never    Smokeless tobacco: Never   Vaping Use    Vaping status: Never Used   Substance and Sexual Activity    Alcohol use: No    Drug use: No    Sexual activity: Yes     Partners: Male     Birth control/protection: I.U.D.         ALLERGIES  Cephalexin, Hydrocodone, Oxycodone, and  Penicillins      REVIEW OF SYSTEMS  Review of Systems  Included in HPI  All systems reviewed and negative except for those discussed in HPI.      PHYSICAL EXAM    I have reviewed the triage vital signs and nursing notes.    ED Triage Vitals   Temp Heart Rate Resp BP SpO2   04/20/25 1700 04/20/25 1700 04/20/25 1700 04/20/25 1701 04/20/25 1700   98.6 °F (37 °C) 98 24 (!) 190/129 100 %      Temp src Heart Rate Source Patient Position BP Location FiO2 (%)   -- -- -- -- --              Physical Exam  GENERAL: alert, patient lying on her left side.  Appears to be in quite a lot of pain.  Moaning.  SKIN: Warm, dry, no rashes  HENT: Normocephalic, atraumatic  EYES: no scleral icterus, normal conjunctivae  CV: regular rhythm, regular rate, normal perfusion  RESPIRATORY: normal effort, lungs clear bilaterally  ABDOMEN: soft, nondistended, minimal tenderness in the right lower quadrant and right CVA area.  No peritoneal features.  No masses palpable  MUSCULOSKELETAL: no deformity, no asymmetry of extremities  NEURO: alert, moves all extremities, follows commands      LAB RESULTS  Recent Results (from the past 24 hours)   Comprehensive Metabolic Panel    Collection Time: 04/20/25  5:23 PM    Specimen: Blood   Result Value Ref Range    Glucose 104 (H) 65 - 99 mg/dL    BUN 10 6 - 20 mg/dL    Creatinine 0.84 0.57 - 1.00 mg/dL    Sodium 139 136 - 145 mmol/L    Potassium 3.8 3.5 - 5.2 mmol/L    Chloride 104 98 - 107 mmol/L    CO2 26.0 22.0 - 29.0 mmol/L    Calcium 9.6 8.6 - 10.5 mg/dL    Total Protein 7.5 6.0 - 8.5 g/dL    Albumin 4.2 3.5 - 5.2 g/dL    ALT (SGPT) 20 1 - 33 U/L    AST (SGOT) 16 1 - 32 U/L    Alkaline Phosphatase 113 39 - 117 U/L    Total Bilirubin 0.2 0.0 - 1.2 mg/dL    Globulin 3.3 gm/dL    A/G Ratio 1.3 g/dL    BUN/Creatinine Ratio 11.9 7.0 - 25.0    Anion Gap 9.0 5.0 - 15.0 mmol/L    eGFR 90.2 >60.0 mL/min/1.73   hCG, Serum, Qualitative    Collection Time: 04/20/25  5:23 PM    Specimen: Blood   Result Value Ref  Range    HCG Qualitative Negative Negative   CBC Auto Differential    Collection Time: 04/20/25  5:23 PM    Specimen: Blood   Result Value Ref Range    WBC 7.75 3.40 - 10.80 10*3/mm3    RBC 4.68 3.77 - 5.28 10*6/mm3    Hemoglobin 13.5 12.0 - 15.9 g/dL    Hematocrit 41.1 34.0 - 46.6 %    MCV 87.8 79.0 - 97.0 fL    MCH 28.8 26.6 - 33.0 pg    MCHC 32.8 31.5 - 35.7 g/dL    RDW 13.2 12.3 - 15.4 %    RDW-SD 42.4 37.0 - 54.0 fl    MPV 10.1 6.0 - 12.0 fL    Platelets 290 140 - 450 10*3/mm3    Neutrophil % 67.7 42.7 - 76.0 %    Lymphocyte % 23.4 19.6 - 45.3 %    Monocyte % 5.9 5.0 - 12.0 %    Eosinophil % 2.3 0.3 - 6.2 %    Basophil % 0.6 0.0 - 1.5 %    Immature Grans % 0.1 0.0 - 0.5 %    Neutrophils, Absolute 5.24 1.70 - 7.00 10*3/mm3    Lymphocytes, Absolute 1.81 0.70 - 3.10 10*3/mm3    Monocytes, Absolute 0.46 0.10 - 0.90 10*3/mm3    Eosinophils, Absolute 0.18 0.00 - 0.40 10*3/mm3    Basophils, Absolute 0.05 0.00 - 0.20 10*3/mm3    Immature Grans, Absolute 0.01 0.00 - 0.05 10*3/mm3    nRBC 0.0 0.0 - 0.2 /100 WBC   Urinalysis With Culture If Indicated - Urine, Clean Catch    Collection Time: 04/20/25  8:47 PM    Specimen: Urine, Clean Catch   Result Value Ref Range    Color, UA Yellow Yellow, Straw    Appearance, UA Cloudy (A) Clear    pH, UA 6.0 5.0 - 8.0    Specific Gravity, UA 1.023 1.005 - 1.030    Glucose, UA Negative Negative    Ketones, UA Trace (A) Negative    Bilirubin, UA Negative Negative    Blood, UA Moderate (2+) (A) Negative    Protein, UA Trace (A) Negative    Leuk Esterase, UA Trace (A) Negative    Nitrite, UA Negative Negative    Urobilinogen, UA 0.2 E.U./dL 0.2 - 1.0 E.U./dL   Urinalysis, Microscopic Only - Urine, Clean Catch    Collection Time: 04/20/25  8:47 PM    Specimen: Urine, Clean Catch   Result Value Ref Range    RBC, UA 21-50 (A) None Seen, 0-2 /HPF    WBC, UA 0-2 None Seen, 0-2 /HPF    Bacteria, UA None Seen None Seen /HPF    Squamous Epithelial Cells, UA 7-12 (A) None Seen, 0-2 /HPF    Yeast,  UA Present (A) None Seen /HPF    Hyaline Casts, UA None Seen None Seen /LPF    Calcium Oxalate Crystals, UA Present None Seen /HPF    Methodology Automated Microscopy          RADIOLOGY  CT Abdomen Pelvis Without Contrast  Result Date: 4/20/2025  CT ABDOMEN PELVIS WO CONTRAST-  INDICATIONS: Right flank pain  TECHNIQUE: Radiation dose reduction techniques were utilized, including automated exposure control and exposure modulation based on body size. Unenhanced ABDOMEN AND PELVIS CT  COMPARISON: None available  FINDINGS:  Cholelithiasis is demonstrated.  Fatty infiltration of the pancreas is seen.  A stone in the proximal right ureter measures 6 mm with mild right hydronephrosis. Punctate nonobstructive left nephrolithiasis. Intrauterine device is seen in the uterus.  The spleen is enlarged, 13.6 cm. The liver is also enlarged.  Otherwise unremarkable unenhanced appearance of the liver, spleen, adrenal glands, pancreas, kidneys, bladder.  No bowel obstruction. Assessment of the colon for wall thickening is limited by lack of distention. The appendix does not appear inflamed.  No free intraperitoneal gas or free fluid. Small umbilical hernia of fat is present  Scattered small mesenteric and para-aortic lymph nodes are seen that are not significant by size criteria.  Abdominal aorta is not aneurysmal. Aortic and other arterial calcifications are present.  The lung bases minimal likely atelectasis or scarring in the lingula.  Degenerative changes are seen in the spine. No acute fracture is identified.          1. A 6 mm stone in the proximal right ureter with mild right hydronephrosis. Punctate nonobstructive left nephrolithiasis.  2. Cholelithiasis. Hepatosplenomegaly. Fatty infiltration of the pancreas.  3. No acute inflammatory process of bowel is identified, follow-up as indications persist.  This report was finalized on 4/20/2025 6:18 PM by Dr. Tripp Santiago M.D on Workstation: [id number]          MEDICATIONS  GIVEN IN ER  Medications   sodium chloride 0.9 % flush 10 mL (has no administration in time range)   sodium chloride 0.9 % flush 10 mL (10 mL Intravenous Given 4/20/25 2044)   sodium chloride 0.9 % flush 10 mL (10 mL Intravenous Given 4/21/25 0045)   sodium chloride 0.9 % infusion 40 mL (has no administration in time range)   ondansetron ODT (ZOFRAN-ODT) disintegrating tablet 4 mg ( Oral Not Given:  See Alt 4/20/25 2352)     Or   ondansetron (ZOFRAN) injection 4 mg (4 mg Intravenous Given 4/20/25 2352)   HYDROmorphone (DILAUDID) injection 1 mg (1 mg Intravenous Given 4/20/25 2352)   prochlorperazine (COMPAZINE) injection 5 mg (5 mg Intravenous Given 4/20/25 2140)   nortriptyline (PAMELOR) capsule 10 mg (10 mg Oral Given 4/20/25 2301)   sucralfate (CARAFATE) tablet 1 g (1 g Oral Given 4/20/25 2140)   desvenlafaxine (PRISTIQ) 24 hr tablet 50 mg (50 mg Oral Given 4/20/25 2302)   pantoprazole (PROTONIX) EC tablet 40 mg (40 mg Oral Given 4/20/25 2140)   valsartan (DIOVAN) tablet 80 mg (80 mg Oral Given 4/20/25 2302)   HYDROmorphone (DILAUDID) injection 0.5 mg (0.5 mg Intravenous Given 4/20/25 1743)   ondansetron (ZOFRAN) injection 4 mg (4 mg Intravenous Given 4/20/25 1745)   lactated ringers bolus 1,000 mL (0 mL Intravenous Stopped 4/20/25 1816)   HYDROmorphone (DILAUDID) injection 1 mg (1 mg Intravenous Given 4/20/25 1817)   lidocaine (XYLOCAINE) 1 % 125 mg in sodium chloride 0.9 % 250 mL IVPB (125 mg Intravenous Given 4/20/25 1911)   prochlorperazine (COMPAZINE) injection 5 mg (5 mg Intravenous Given 4/21/25 0044)         ORDERS PLACED DURING THIS VISIT:  Orders Placed This Encounter   Procedures    CT Abdomen Pelvis Without Contrast    Comprehensive Metabolic Panel    hCG, Serum, Qualitative    Urinalysis With Culture If Indicated - Urine, Clean Catch    CBC Auto Differential    Urinalysis, Microscopic Only - Urine, Clean Catch    NPO Diet NPO Type: Strict NPO    Intake & Output    Weigh Patient    Oral Care    Saline  Lock & Maintain IV Access    Vital Signs    Activity - Ad Tameka    Opioid Administration - Document EtCO2 and / or SpO2 With Each Set of Vitals & Any Change in Patient Status    Opioid Administration - Notify Provider Hypercapnic Monitoring    Code Status and Medical Interventions: CPR (Attempt to Resuscitate); Full Support    Urology (on-call MD unless specified)    Inpatient Urology Consult    Opioid Administration - Capnography    Insert Peripheral IV    Insert Peripheral IV    Initiate Emergency Department Observation Status    CBC & Differential         OUTPATIENT MEDICATION MANAGEMENT:  Current Facility-Administered Medications Ordered in Epic   Medication Dose Route Frequency Provider Last Rate Last Admin    desvenlafaxine (PRISTIQ) 24 hr tablet 50 mg  50 mg Oral Nightly Mari Payne APRN   50 mg at 04/20/25 2302    HYDROmorphone (DILAUDID) injection 1 mg  1 mg Intravenous Q2H PRN Mari Payne APRN   1 mg at 04/20/25 2352    nortriptyline (PAMELOR) capsule 10 mg  10 mg Oral Nightly Mari Payne, APRN   10 mg at 04/20/25 2301    ondansetron ODT (ZOFRAN-ODT) disintegrating tablet 4 mg  4 mg Oral Q6H PRN Mari Payne APRN        Or    ondansetron (ZOFRAN) injection 4 mg  4 mg Intravenous Q6H PRN Mari Payne APRN   4 mg at 04/20/25 2352    pantoprazole (PROTONIX) EC tablet 40 mg  40 mg Oral Nightly Mari Payne APRN   40 mg at 04/20/25 2140    prochlorperazine (COMPAZINE) injection 5 mg  5 mg Intravenous Q4H PRN Mari Payne APRN   5 mg at 04/20/25 2140    sodium chloride 0.9 % flush 10 mL  10 mL Intravenous PRN Morgan Bradley MD        sodium chloride 0.9 % flush 10 mL  10 mL Intravenous Q12H Mari Payne APRN   10 mL at 04/20/25 2044    sodium chloride 0.9 % flush 10 mL  10 mL Intravenous PRN Mari Payne APRN   10 mL at 04/21/25 0045    sodium chloride 0.9 % infusion 40 mL  40 mL Intravenous PRN Mari Payne APRN        sucralfate (CARAFATE) tablet 1 g  1 g Oral 4x Daily  AC & at Bedtime Mari Payne LYNN   1 g at 04/20/25 2140    valsartan (DIOVAN) tablet 80 mg  80 mg Oral Nightly Mari Payne APRN   80 mg at 04/20/25 2302     No current [facility]-ordered outpatient medications on file.         PROCEDURES  Procedures        PROGRESS, DATA ANALYSIS, CONSULTS, AND MEDICAL DECISION MAKING  All labs have been independently interpreted by me.  All radiology studies have been reviewed by me. All EKG's have been independently viewed and interpreted by me.  Discussion below represents my analysis of pertinent findings related to patient's condition, differential diagnosis, treatment plan and final disposition.    Differential diagnosis includes but is not limited to kidney stone, ovarian cyst, ovarian torsion, IBS, diverticulitis, bowel obstruction, UTI, pyelonephritis.    Clinical Scores:                   ED Course as of 04/21/25 0045   Sun Apr 20, 2025   1902 I discussed with Dr. Koroma from [facility] urology about this patient.  He agrees to consult.  Requests that we place her in the observation unit for further medical management needs tonight.  Requests that we keep her n.p.o. after midnight. [MARISEL]   1922 I discussed with Mari LYNN Payne, in the observation unit about this patient.  She agrees to admit the patient for further medical management needs tonight on behalf of of Dr. Pressley [MARISEL]   1922 I independently interpreted the CT abdomen and pelvis without contrast study and my findings are: No bowel obstruction.  There is a gallstone present.  There is a right ureteral stone present in the proximal ureter [MARISEL]   1923 HCG Qualitative: Negative [MARISEL]      ED Course User Index  [MARISEL] Morgan Bradley MD             AS OF 00:45 EDT VITALS:    BP - 161/85  HR - 96  TEMP - 98.2 °F (36.8 °C) (Oral)  O2 SATS - 96%    COMPLEXITY OF CARE  The patient requires admission.      DIAGNOSIS  Final diagnoses:   Right ureteral stone   Hypertension not at goal   Calculus of gallbladder without  cholecystitis without obstruction         DISPOSITION  ED Disposition       ED Disposition   Intended Admit    Condition   --    Comment   --                Please note that portions of this document were completed with a voice recognition program.    Note Disclaimer: At [facility], we believe that sharing information builds trust and better relationships. You are receiving this note because you recently visited [facility]. It is possible you will see health information before a provider has talked with you about it. This kind of information can be easy to misunderstand. To help you fully understand what it means for your health, we urge you to discuss this note with your provider.         Morgan Bradley MD  04/21/25 0045

## 2025-04-20 NOTE — H&P
[facility]   HISTORY AND PHYSICAL    Patient Name: Brittany Russo  : 1985  MRN: [id number]  Primary Care Physician:  Coty Newton MD  Date of admission: 2025    Subjective   Subjective     Chief Complaint:   Chief Complaint   Patient presents with    Flank Pain         HPI:    Brittany Russo is a 39 y.o. female with past medical history significant for anxiety, depression, GERD, hiatal hernia, hypertension, IBS, and heart murmur who presented to the emergency department with a complaint of sided abdominal pain and was admitted to the ED observation unit for further evaluation and workup with a diagnosis of kidney stone.    Patient states she had a sudden onset of right sided abdominal pain that has persisted, waxing and waning in intensity.  States she has had associated nausea with the pain.  Denies any previous history of kidney stones.    ED workup: CBC and CMP unremarkable.  hCG negative.  CT scan of the abdomen and pelvis reported 6 mm stone in the proximal right ureter with mild right hydronephrosis.  Urinalysis is pending.  Patient received IV Dilaudid x 2 doses and IV lidocaine infusion for pain control in the emergency department without significant pain relief.    On admission to the ED observation unit, patient is alert and oriented x 4.  Continues to complain of unrelieved right sided pain.  Blood pressure with mild improvement at 161/97 and states she takes her blood pressure medication at night which would be due now.  Vital signs stable.  N.p.o. after midnight.      Review of Systems   All systems were reviewed and negative except for: As documented in HPI.    Personal History     Past Medical History:   Diagnosis Date    Allergic     Anxiety     Depression     GERD (gastroesophageal reflux disease)     Heart murmur     Hiatal hernia     Hypertension 20 years ago    Initial patient encounter 2020    Irritable bowel syndrome     Normal pregnancy 2020    Scoliosis         Past Surgical History:   Procedure Laterality Date     SECTION  2017 and 3-3-2021    COLONOSCOPY  approx     pt does not recall results     INTRAUTERINE DEVICE INSERTION  2025    Mirena    TONSILLECTOMY AND ADENOIDECTOMY      UPPER GASTROINTESTINAL ENDOSCOPY  approx     patient does not recall results     UVULECTOMY      WISDOM TOOTH EXTRACTION         Family History: family history includes Dementia in her maternal grandmother; Diabetes type I in her nephew; Diabetes type II in her father; Early death in her sister; Factor V Leiden deficiency in her sister; Hyperlipidemia in her father; Hypertension in her brother, brother, and father; Irritable bowel syndrome in her father; Memory loss in her mother; Stroke in her sister; Stroke (age of onset: 70) in her maternal grandfather. Otherwise pertinent FHx was reviewed and not pertinent to current issue.    Social History:  reports that she has never smoked. She has never used smokeless tobacco. She reports that she does not drink alcohol and does not use drugs.    Home Medications:  desvenlafaxine, hydrOXYzine, ibuprofen, nortriptyline, pantoprazole, sucralfate, and valsartan    Allergies:  Allergies   Allergen Reactions    Cephalexin Shortness Of Breath    Hydrocodone Hives, Itching, Nausea And Vomiting and Rash    Oxycodone Itching and Nausea And Vomiting    Penicillins Rash       Objective   Objective     Vitals:   Temp:  [98.6 °F (37 °C)] 98.6 °F (37 °C)  Heart Rate:  [98] 98  Resp:  [24] 24  BP: (190)/(129) 190/129  Physical Exam    Constitutional: Awake, alert   Eyes: PERRLA, sclerae anicteric, no conjunctival injection   HENT: NCAT, mucous membranes moist   Neck: Supple, no thyromegaly, no lymphadenopathy, trachea midline   Respiratory: Non-labored respirations    Cardiovascular: Regular rate   Gastrointestinal: Continues to right-sided flank/abdomen pain   Musculoskeletal: No bilateral ankle edema, no clubbing or cyanosis to  extremities   Psychiatric: Appropriate affect, cooperative  Neurologic: Oriented x 3, strength symmetric in all extremities, Cranial Nerves grossly intact to confrontation, speech clear   Skin: No rashes     Result Review    Result Review:  I have personally reviewed the results from the time of this admission to 4/20/2025 19:31 EDT and agree with these findings:  [x]  Laboratory list / accordion  []  Microbiology  [x]  Radiology  []  EKG/Telemetry   []  Cardiology/Vascular   []  Pathology  []  Old records  []  Other:  Most notable findings include: CT scan of the abdomen and pelvis reported 6 mm stone in the proximal right ureter with mild right hydronephrosis.        Assessment & Plan   The 10-year ASCVD risk score (Mary CHANEY, et al., 2019) is: 3.7%    Values used to calculate the score:      Age: 39 years      Sex: Female      Is Non- : No      Diabetic: No      Tobacco smoker: No      Systolic Blood Pressure: 190 mmHg      Is BP treated: Yes      HDL Cholesterol: 55 mg/dL      Total Cholesterol: 291 mg/dL    Assessment / Plan     Brief Patient Summary:  Brittany Russo is a 39 y.o. female who was admitted to the ED observation unit for further evaluation and workup with a complaint of right sided abdomen/flank pain and a diagnosis of 6 mm stone in the right ureter with mild hydronephrosis.  Analgesic medication for pain control.  N.p.o. after midnight.  Urology consulted by ED provider.    Active Hospital Problems:  Active Hospital Problems    Diagnosis     **Kidney stone      Plan:     Kidney stone  -Labs grossly unremarkable  -CT scan of the abdomen and pelvis reported 6 mm stone in the proximal right ureter with mild right hydronephrosis.  -Urinalysis pending  -Analgesic medication for pain control  -Lidocaine infusion given in ED  -N.p.o. after midnight  - Urology consulted by ED provider    Hypertension  Vital signs every 4 hours and as needed  Continue home medication of  valsartan    GERD  Continue home medications of Protonix and Carafate    Anxiety/depression  Continue home medications of Pristiq and Pamelor      VTE Prophylaxis:  Mechanical VTE prophylaxis orders are present.        CODE STATUS:    Code Status (Patient has no pulse and is not breathing): CPR (Attempt to Resuscitate)  Medical Interventions (Patient has pulse or is breathing): Full Support  Level Of Support Discussed With: Patient    Admission Status:  I believe this patient meets observation status.    Electronically signed by LYNN Kay, 04/20/25, 7:31 PM EDT.      75 minutes has been spent by [facility] providers in the care of this patient while under observation status on this date 04/20/25      I have worn appropriate PPE during this patient encounter, sanitized my hands both with entering and exiting patient's room.    I have discussed plan of care with patient including advance care plan and/or surrogate decision maker.  Patient advises that their  will be their primary surrogate decision maker

## 2025-04-20 NOTE — PROGRESS NOTES
MD ATTESTATION NOTE      Brief HPI: Patient is being admitted from the ED for a right ureteral stone.  She began having right sided abdominal pain several hours ago.  Pain radiates into the right flank.  Pain is constant but waxing and waning.  She has had associated nausea.  Denies fever or vomiting.  She has not urinated since the pain began.  Denies history of kidney stones.  In the ED, CT abdomen/pelvis showed a 6 mm stone in the proximal right ureter with mild right hydronephrosis.  Renal function and white blood cell count were normal.  hCG was negative.  She was given Dilaudid, Zofran, and lidocaine    PHYSICAL EXAM    GENERAL: Awake, alert, and oriented x 3.  Nontoxic-appearing female.  She appears uncomfortable  HENT: nares patent  EYES: no scleral icterus  CV: regular rhythm, normal rate  RESPIRATORY: normal effort, CTAB  ABDOMEN: soft, there is right mid abdominal tenderness without rebound or guarding  MUSCULOSKELETAL: no deformity, extremities are nontender  NEURO: moves all extremities, follows commands, speech is clear and fluent, no facial droop  PSYCH:  calm, cooperative  SKIN: warm, dry    Vital signs and nursing notes reviewed.        Assessment/plan:     Acute right renal colic due to right ureteral stone: Obtain urinalysis.  IV pain/nausea medications as needed.  Consult urology.

## 2025-04-20 NOTE — ED NOTES
".Nursing report ED to floor  Brittany Russo  39 y.o.  female    HPI :  HPI  Stated Reason for Visit: flank pain  History Obtained From: patient    Chief Complaint  Chief Complaint   Patient presents with    Flank Pain       Admitting doctor:   Aman Pressley MD    Admitting diagnosis:   There were no encounter diagnoses.    Code status:   Current Code Status       Date Active Code Status Order ID Comments User Context       4/20/2025 1930 CPR (Attempt to Resuscitate) [id number]  Mari Payne, APRN ED        Question Answer    Code Status (Patient has no pulse and is not breathing) CPR (Attempt to Resuscitate)    Medical Interventions (Patient has pulse or is breathing) Full Support    Level Of Support Discussed With Patient                    Allergies:   Cephalexin, Hydrocodone, Oxycodone, and Penicillins    Isolation:   No active isolations    Intake and Output    Intake/Output Summary (Last 24 hours) at 4/20/2025 1931  Last data filed at 4/20/2025 1816  Gross per 24 hour   Intake 1000 ml   Output --   Net 1000 ml       Weight:       04/20/25  1700   Weight: 90.7 kg (200 lb)       Most recent vitals:   Vitals:    04/20/25 1700 04/20/25 1701   BP:  (!) 190/129   Pulse: 98    Resp: 24    Temp: 98.6 °F (37 °C)    SpO2: 100%    Weight: 90.7 kg (200 lb)    Height: 160 cm (63\")        Active LDAs/IV Access:   Lines, Drains & Airways       Active LDAs       Name Placement date Placement time Site Days    Peripheral IV 04/20/25 1743 20 G Right Antecubital 04/20/25  1743  Antecubital  less than 1                    Labs (abnormal labs have a star):   Labs Reviewed   COMPREHENSIVE METABOLIC PANEL - Abnormal; Notable for the following components:       Result Value    Glucose 104 (*)     All other components within normal limits    Narrative:     GFR Categories in Chronic Kidney Disease (CKD)      GFR Category          GFR (mL/min/1.73)    Interpretation  G1                     90 or greater         Normal or high " (1)  G2                      60-89                Mild decrease (1)  G3a                   45-59                Mild to moderate decrease  G3b                   30-44                Moderate to severe decrease  G4                    15-29                Severe decrease  G5                    14 or less           Kidney failure          (1)In the absence of evidence of kidney disease, neither GFR category G1 or G2 fulfill the criteria for CKD.    eGFR calculation 2021 CKD-EPI creatinine equation, which does not include race as a factor   HCG, SERUM, QUALITATIVE - Normal   CBC WITH AUTO DIFFERENTIAL - Normal   URINALYSIS W/ CULTURE IF INDICATED   CBC AND DIFFERENTIAL    Narrative:     The following orders were created for panel order CBC & Differential.  Procedure                               Abnormality         Status                     ---------                               -----------         ------                     CBC Auto Differential[[id number]]        Normal              Final result                 Please view results for these tests on the individual orders.       EKG:   No orders to display       Meds given in ED:   Medications   sodium chloride 0.9 % flush 10 mL (has no administration in time range)   lidocaine (XYLOCAINE) 1 % 125 mg in sodium chloride 0.9 % 250 mL IVPB (125 mg Intravenous Given 4/20/25 1911)   sodium chloride 0.9 % flush 10 mL (has no administration in time range)   sodium chloride 0.9 % flush 10 mL (has no administration in time range)   sodium chloride 0.9 % infusion 40 mL (has no administration in time range)   ondansetron ODT (ZOFRAN-ODT) disintegrating tablet 4 mg (has no administration in time range)     Or   ondansetron (ZOFRAN) injection 4 mg (has no administration in time range)   HYDROmorphone (DILAUDID) injection 0.5 mg (0.5 mg Intravenous Given 4/20/25 1743)   ondansetron (ZOFRAN) injection 4 mg (4 mg Intravenous Given 4/20/25 1745)   lactated ringers bolus 1,000 mL (0 mL  Intravenous Stopped 4/20/25 1816)   HYDROmorphone (DILAUDID) injection 1 mg (1 mg Intravenous Given 4/20/25 1817)       Imaging results:  CT Abdomen Pelvis Without Contrast  Result Date: 4/20/2025    1. A 6 mm stone in the proximal right ureter with mild right hydronephrosis. Punctate nonobstructive left nephrolithiasis.  2. Cholelithiasis. Hepatosplenomegaly. Fatty infiltration of the pancreas.  3. No acute inflammatory process of bowel is identified, follow-up as indications persist.  This report was finalized on 4/20/2025 6:18 PM by Dr. Tripp Santiago M.D on Workstation: [email or web address]        Ambulatory status:   - stand by    Social issues:   Social History     Socioeconomic History    Marital status:    Tobacco Use    Smoking status: Never    Smokeless tobacco: Never   Vaping Use    Vaping status: Never Used   Substance and Sexual Activity    Alcohol use: No    Drug use: No    Sexual activity: Yes     Partners: Male     Birth control/protection: I.U.D.       Peripheral Neurovascular  Peripheral Neurovascular (Adult)  Peripheral Neurovascular WDL: WDL    Neuro Cognitive  Neuro Cognitive (Adult)  Cognitive/Neuro/Behavioral WDL: WDL, orientation  Orientation: oriented x 4    Learning  Learning Assessment  Learning Readiness and Ability: no barriers identified    Respiratory  Respiratory WDL  Respiratory WDL: WDL    Abdominal Pain       Pain Assessments  Pain (Adult)  (0-10) Pain Rating: Rest: 10    NIH Stroke Scale       Roma Stovall RN  04/20/25 19:31 EDT

## 2025-04-21 ENCOUNTER — READMISSION MANAGEMENT (OUTPATIENT)
Dept: CALL CENTER | Facility: HOSPITAL | Age: 40
End: 2025-04-21
Payer: COMMERCIAL

## 2025-04-21 ENCOUNTER — ANESTHESIA (OUTPATIENT)
Dept: PERIOP | Facility: HOSPITAL | Age: 40
End: 2025-04-21
Payer: COMMERCIAL

## 2025-04-21 ENCOUNTER — ANESTHESIA EVENT (OUTPATIENT)
Dept: PERIOP | Facility: HOSPITAL | Age: 40
End: 2025-04-21
Payer: COMMERCIAL

## 2025-04-21 VITALS
TEMPERATURE: 98 F | HEART RATE: 100 BPM | HEIGHT: 63 IN | OXYGEN SATURATION: 95 % | SYSTOLIC BLOOD PRESSURE: 127 MMHG | WEIGHT: 200 LBS | DIASTOLIC BLOOD PRESSURE: 79 MMHG | BODY MASS INDEX: 35.44 KG/M2 | RESPIRATION RATE: 16 BRPM

## 2025-04-21 DIAGNOSIS — E66.813 CLASS 3 SEVERE OBESITY DUE TO EXCESS CALORIES WITH SERIOUS COMORBIDITY AND BODY MASS INDEX (BMI) OF 40.0 TO 44.9 IN ADULT: ICD-10-CM

## 2025-04-21 DIAGNOSIS — K21.9 GASTROESOPHAGEAL REFLUX DISEASE WITHOUT ESOPHAGITIS: ICD-10-CM

## 2025-04-21 DIAGNOSIS — E66.01 CLASS 3 SEVERE OBESITY DUE TO EXCESS CALORIES WITH SERIOUS COMORBIDITY AND BODY MASS INDEX (BMI) OF 40.0 TO 44.9 IN ADULT: ICD-10-CM

## 2025-04-21 DIAGNOSIS — F41.1 GAD (GENERALIZED ANXIETY DISORDER): ICD-10-CM

## 2025-04-21 PROBLEM — N20.1 URETEROLITHIASIS: Status: ACTIVE | Noted: 2025-04-21

## 2025-04-21 PROCEDURE — C2617 STENT, NON-COR, TEM W/O DEL: HCPCS | Performed by: STUDENT IN AN ORGANIZED HEALTH CARE EDUCATION/TRAINING PROGRAM

## 2025-04-21 PROCEDURE — 25010000002 ONDANSETRON PER 1 MG: Performed by: STUDENT IN AN ORGANIZED HEALTH CARE EDUCATION/TRAINING PROGRAM

## 2025-04-21 PROCEDURE — 25010000002 PROCHLORPERAZINE 10 MG/2ML SOLUTION: Performed by: NURSE PRACTITIONER

## 2025-04-21 PROCEDURE — 25010000002 CEFAZOLIN PER 500 MG: Performed by: STUDENT IN AN ORGANIZED HEALTH CARE EDUCATION/TRAINING PROGRAM

## 2025-04-21 PROCEDURE — C1769 GUIDE WIRE: HCPCS | Performed by: STUDENT IN AN ORGANIZED HEALTH CARE EDUCATION/TRAINING PROGRAM

## 2025-04-21 PROCEDURE — 25010000002 PROPOFOL 10 MG/ML EMULSION: Performed by: STUDENT IN AN ORGANIZED HEALTH CARE EDUCATION/TRAINING PROGRAM

## 2025-04-21 PROCEDURE — C1758 CATHETER, URETERAL: HCPCS | Performed by: STUDENT IN AN ORGANIZED HEALTH CARE EDUCATION/TRAINING PROGRAM

## 2025-04-21 PROCEDURE — 25810000003 SODIUM CHLORIDE 0.9 % SOLUTION: Performed by: NURSE PRACTITIONER

## 2025-04-21 PROCEDURE — 25810000003 LACTATED RINGERS PER 1000 ML: Performed by: ANESTHESIOLOGY

## 2025-04-21 PROCEDURE — 25010000002 LIDOCAINE 2% SOLUTION: Performed by: STUDENT IN AN ORGANIZED HEALTH CARE EDUCATION/TRAINING PROGRAM

## 2025-04-21 PROCEDURE — G0378 HOSPITAL OBSERVATION PER HR: HCPCS

## 2025-04-21 PROCEDURE — 25010000002 FENTANYL CITRATE (PF) 50 MCG/ML SOLUTION: Performed by: ANESTHESIOLOGY

## 2025-04-21 PROCEDURE — 96376 TX/PRO/DX INJ SAME DRUG ADON: CPT

## 2025-04-21 PROCEDURE — 25010000002 DIPHENHYDRAMINE PER 50 MG: Performed by: STUDENT IN AN ORGANIZED HEALTH CARE EDUCATION/TRAINING PROGRAM

## 2025-04-21 PROCEDURE — 25010000002 HYDROMORPHONE 1 MG/ML SOLUTION: Performed by: NURSE PRACTITIONER

## 2025-04-21 PROCEDURE — 25010000002 GLYCOPYRROLATE 1 MG/5ML SOLUTION: Performed by: STUDENT IN AN ORGANIZED HEALTH CARE EDUCATION/TRAINING PROGRAM

## 2025-04-21 PROCEDURE — 25010000002 KETOROLAC TROMETHAMINE PER 15 MG: Performed by: STUDENT IN AN ORGANIZED HEALTH CARE EDUCATION/TRAINING PROGRAM

## 2025-04-21 PROCEDURE — 25010000002 FAMOTIDINE 10 MG/ML SOLUTION: Performed by: ANESTHESIOLOGY

## 2025-04-21 PROCEDURE — 25010000002 MIDAZOLAM PER 1 MG: Performed by: ANESTHESIOLOGY

## 2025-04-21 PROCEDURE — 25010000002 DEXAMETHASONE SODIUM PHOSPHATE 20 MG/5ML SOLUTION: Performed by: STUDENT IN AN ORGANIZED HEALTH CARE EDUCATION/TRAINING PROGRAM

## 2025-04-21 DEVICE — URETERAL STENT
Type: IMPLANTABLE DEVICE | Site: URETER | Status: FUNCTIONAL
Brand: CONTOUR™

## 2025-04-21 RX ORDER — LIDOCAINE HYDROCHLORIDE 20 MG/ML
INJECTION, SOLUTION INFILTRATION; PERINEURAL AS NEEDED
Status: DISCONTINUED | OUTPATIENT
Start: 2025-04-21 | End: 2025-04-21 | Stop reason: SURG

## 2025-04-21 RX ORDER — DROPERIDOL 2.5 MG/ML
0.62 INJECTION, SOLUTION INTRAMUSCULAR; INTRAVENOUS
Status: DISCONTINUED | OUTPATIENT
Start: 2025-04-21 | End: 2025-04-21 | Stop reason: HOSPADM

## 2025-04-21 RX ORDER — SODIUM CHLORIDE 9 MG/ML
100 INJECTION, SOLUTION INTRAVENOUS CONTINUOUS
Status: DISCONTINUED | OUTPATIENT
Start: 2025-04-21 | End: 2025-04-21 | Stop reason: HOSPADM

## 2025-04-21 RX ORDER — PROCHLORPERAZINE EDISYLATE 5 MG/ML
5 INJECTION INTRAMUSCULAR; INTRAVENOUS ONCE
Status: COMPLETED | OUTPATIENT
Start: 2025-04-21 | End: 2025-04-21

## 2025-04-21 RX ORDER — KETOROLAC TROMETHAMINE 30 MG/ML
INJECTION, SOLUTION INTRAMUSCULAR; INTRAVENOUS AS NEEDED
Status: DISCONTINUED | OUTPATIENT
Start: 2025-04-21 | End: 2025-04-21 | Stop reason: SURG

## 2025-04-21 RX ORDER — PROMETHAZINE HYDROCHLORIDE 25 MG/1
25 SUPPOSITORY RECTAL ONCE AS NEEDED
Status: DISCONTINUED | OUTPATIENT
Start: 2025-04-21 | End: 2025-04-21 | Stop reason: HOSPADM

## 2025-04-21 RX ORDER — HYDROMORPHONE HYDROCHLORIDE 1 MG/ML
0.5 INJECTION, SOLUTION INTRAMUSCULAR; INTRAVENOUS; SUBCUTANEOUS
Status: DISCONTINUED | OUTPATIENT
Start: 2025-04-21 | End: 2025-04-21 | Stop reason: HOSPADM

## 2025-04-21 RX ORDER — TRAMADOL HYDROCHLORIDE 50 MG/1
50 TABLET ORAL EVERY 6 HOURS PRN
Qty: 20 TABLET | Refills: 0 | Status: SHIPPED | OUTPATIENT
Start: 2025-04-21 | End: 2025-04-22 | Stop reason: SDUPTHER

## 2025-04-21 RX ORDER — DIPHENHYDRAMINE HYDROCHLORIDE 50 MG/ML
12.5 INJECTION, SOLUTION INTRAMUSCULAR; INTRAVENOUS
Status: DISCONTINUED | OUTPATIENT
Start: 2025-04-21 | End: 2025-04-21 | Stop reason: HOSPADM

## 2025-04-21 RX ORDER — SODIUM CHLORIDE 0.9 % (FLUSH) 0.9 %
3 SYRINGE (ML) INJECTION EVERY 12 HOURS SCHEDULED
Status: DISCONTINUED | OUTPATIENT
Start: 2025-04-21 | End: 2025-04-21 | Stop reason: HOSPADM

## 2025-04-21 RX ORDER — DOCUSATE SODIUM 100 MG/1
100 CAPSULE, LIQUID FILLED ORAL DAILY PRN
Qty: 15 CAPSULE | Refills: 0 | Status: SHIPPED | OUTPATIENT
Start: 2025-04-21 | End: 2025-05-06

## 2025-04-21 RX ORDER — DEXAMETHASONE SODIUM PHOSPHATE 4 MG/ML
INJECTION, SOLUTION INTRA-ARTICULAR; INTRALESIONAL; INTRAMUSCULAR; INTRAVENOUS; SOFT TISSUE AS NEEDED
Status: DISCONTINUED | OUTPATIENT
Start: 2025-04-21 | End: 2025-04-21 | Stop reason: SURG

## 2025-04-21 RX ORDER — MAGNESIUM HYDROXIDE 1200 MG/15ML
LIQUID ORAL AS NEEDED
Status: DISCONTINUED | OUTPATIENT
Start: 2025-04-21 | End: 2025-04-21 | Stop reason: HOSPADM

## 2025-04-21 RX ORDER — FLUCONAZOLE 100 MG/1
100 TABLET ORAL ONCE
Status: DISCONTINUED | OUTPATIENT
Start: 2025-04-21 | End: 2025-04-21 | Stop reason: HOSPADM

## 2025-04-21 RX ORDER — LABETALOL HYDROCHLORIDE 5 MG/ML
5 INJECTION, SOLUTION INTRAVENOUS
Status: DISCONTINUED | OUTPATIENT
Start: 2025-04-21 | End: 2025-04-21 | Stop reason: HOSPADM

## 2025-04-21 RX ORDER — DIPHENHYDRAMINE HCL 25 MG
25 CAPSULE ORAL EVERY 6 HOURS PRN
Qty: 12 CAPSULE | Refills: 0 | Status: SHIPPED | OUTPATIENT
Start: 2025-04-21 | End: 2025-04-24

## 2025-04-21 RX ORDER — PROPOFOL 10 MG/ML
VIAL (ML) INTRAVENOUS AS NEEDED
Status: DISCONTINUED | OUTPATIENT
Start: 2025-04-21 | End: 2025-04-21 | Stop reason: SURG

## 2025-04-21 RX ORDER — GLYCOPYRROLATE 0.2 MG/ML
INJECTION INTRAMUSCULAR; INTRAVENOUS AS NEEDED
Status: DISCONTINUED | OUTPATIENT
Start: 2025-04-21 | End: 2025-04-21 | Stop reason: SURG

## 2025-04-21 RX ORDER — LIDOCAINE HYDROCHLORIDE 10 MG/ML
0.5 INJECTION, SOLUTION INFILTRATION; PERINEURAL ONCE AS NEEDED
Status: DISCONTINUED | OUTPATIENT
Start: 2025-04-21 | End: 2025-04-21 | Stop reason: HOSPADM

## 2025-04-21 RX ORDER — ATROPINE SULFATE 0.4 MG/ML
0.4 INJECTION, SOLUTION INTRAMUSCULAR; INTRAVENOUS; SUBCUTANEOUS ONCE AS NEEDED
Status: DISCONTINUED | OUTPATIENT
Start: 2025-04-21 | End: 2025-04-21 | Stop reason: HOSPADM

## 2025-04-21 RX ORDER — IPRATROPIUM BROMIDE AND ALBUTEROL SULFATE 2.5; .5 MG/3ML; MG/3ML
3 SOLUTION RESPIRATORY (INHALATION) ONCE AS NEEDED
Status: DISCONTINUED | OUTPATIENT
Start: 2025-04-21 | End: 2025-04-21 | Stop reason: HOSPADM

## 2025-04-21 RX ORDER — DIPHENHYDRAMINE HYDROCHLORIDE 50 MG/ML
INJECTION, SOLUTION INTRAMUSCULAR; INTRAVENOUS AS NEEDED
Status: DISCONTINUED | OUTPATIENT
Start: 2025-04-21 | End: 2025-04-21 | Stop reason: SURG

## 2025-04-21 RX ORDER — FLUMAZENIL 0.1 MG/ML
0.2 INJECTION INTRAVENOUS AS NEEDED
Status: DISCONTINUED | OUTPATIENT
Start: 2025-04-21 | End: 2025-04-21 | Stop reason: HOSPADM

## 2025-04-21 RX ORDER — ONDANSETRON 2 MG/ML
4 INJECTION INTRAMUSCULAR; INTRAVENOUS ONCE AS NEEDED
Status: COMPLETED | OUTPATIENT
Start: 2025-04-21 | End: 2025-04-21

## 2025-04-21 RX ORDER — FAMOTIDINE 10 MG/ML
20 INJECTION, SOLUTION INTRAVENOUS ONCE
Status: COMPLETED | OUTPATIENT
Start: 2025-04-21 | End: 2025-04-21

## 2025-04-21 RX ORDER — SODIUM CHLORIDE 0.9 % (FLUSH) 0.9 %
3-10 SYRINGE (ML) INJECTION AS NEEDED
Status: DISCONTINUED | OUTPATIENT
Start: 2025-04-21 | End: 2025-04-21 | Stop reason: HOSPADM

## 2025-04-21 RX ORDER — FENTANYL CITRATE 50 UG/ML
50 INJECTION, SOLUTION INTRAMUSCULAR; INTRAVENOUS
Status: DISCONTINUED | OUTPATIENT
Start: 2025-04-21 | End: 2025-04-21 | Stop reason: HOSPADM

## 2025-04-21 RX ORDER — MIDAZOLAM HYDROCHLORIDE 1 MG/ML
1 INJECTION, SOLUTION INTRAMUSCULAR; INTRAVENOUS
Status: COMPLETED | OUTPATIENT
Start: 2025-04-21 | End: 2025-04-21

## 2025-04-21 RX ORDER — EPHEDRINE SULFATE 50 MG/ML
5 INJECTION, SOLUTION INTRAVENOUS ONCE AS NEEDED
Status: DISCONTINUED | OUTPATIENT
Start: 2025-04-21 | End: 2025-04-21 | Stop reason: HOSPADM

## 2025-04-21 RX ORDER — SODIUM CHLORIDE, SODIUM LACTATE, POTASSIUM CHLORIDE, CALCIUM CHLORIDE 600; 310; 30; 20 MG/100ML; MG/100ML; MG/100ML; MG/100ML
9 INJECTION, SOLUTION INTRAVENOUS CONTINUOUS
Status: DISCONTINUED | OUTPATIENT
Start: 2025-04-21 | End: 2025-04-21 | Stop reason: HOSPADM

## 2025-04-21 RX ORDER — HYDRALAZINE HYDROCHLORIDE 20 MG/ML
5 INJECTION INTRAMUSCULAR; INTRAVENOUS
Status: DISCONTINUED | OUTPATIENT
Start: 2025-04-21 | End: 2025-04-21 | Stop reason: HOSPADM

## 2025-04-21 RX ORDER — NALOXONE HCL 0.4 MG/ML
0.2 VIAL (ML) INJECTION AS NEEDED
Status: DISCONTINUED | OUTPATIENT
Start: 2025-04-21 | End: 2025-04-21 | Stop reason: HOSPADM

## 2025-04-21 RX ORDER — FENTANYL CITRATE 50 UG/ML
50 INJECTION, SOLUTION INTRAMUSCULAR; INTRAVENOUS ONCE AS NEEDED
Status: COMPLETED | OUTPATIENT
Start: 2025-04-21 | End: 2025-04-21

## 2025-04-21 RX ORDER — ONDANSETRON 2 MG/ML
INJECTION INTRAMUSCULAR; INTRAVENOUS AS NEEDED
Status: DISCONTINUED | OUTPATIENT
Start: 2025-04-21 | End: 2025-04-21 | Stop reason: SURG

## 2025-04-21 RX ORDER — EPHEDRINE SULFATE 50 MG/ML
INJECTION, SOLUTION INTRAVENOUS AS NEEDED
Status: DISCONTINUED | OUTPATIENT
Start: 2025-04-21 | End: 2025-04-21 | Stop reason: SURG

## 2025-04-21 RX ORDER — PROMETHAZINE HYDROCHLORIDE 25 MG/1
25 TABLET ORAL ONCE AS NEEDED
Status: DISCONTINUED | OUTPATIENT
Start: 2025-04-21 | End: 2025-04-21 | Stop reason: HOSPADM

## 2025-04-21 RX ORDER — SULFAMETHOXAZOLE AND TRIMETHOPRIM 800; 160 MG/1; MG/1
1 TABLET ORAL 2 TIMES DAILY
Qty: 6 TABLET | Refills: 0 | Status: SHIPPED | OUTPATIENT
Start: 2025-04-21 | End: 2025-04-24

## 2025-04-21 RX ADMIN — FENTANYL CITRATE 50 MCG: 50 INJECTION, SOLUTION INTRAMUSCULAR; INTRAVENOUS at 11:20

## 2025-04-21 RX ADMIN — ONDANSETRON 4 MG: 2 INJECTION, SOLUTION INTRAMUSCULAR; INTRAVENOUS at 12:18

## 2025-04-21 RX ADMIN — ONDANSETRON 4 MG: 2 INJECTION, SOLUTION INTRAMUSCULAR; INTRAVENOUS at 13:31

## 2025-04-21 RX ADMIN — EPHEDRINE SULFATE 5 MG: 50 INJECTION INTRAVENOUS at 12:21

## 2025-04-21 RX ADMIN — Medication 10 ML: at 08:33

## 2025-04-21 RX ADMIN — SODIUM CHLORIDE 100 ML/HR: 9 INJECTION, SOLUTION INTRAVENOUS at 08:32

## 2025-04-21 RX ADMIN — MIDAZOLAM 1 MG: 1 INJECTION INTRAMUSCULAR; INTRAVENOUS at 11:54

## 2025-04-21 RX ADMIN — LIDOCAINE HYDROCHLORIDE 100 MG: 20 INJECTION, SOLUTION INFILTRATION; PERINEURAL at 12:06

## 2025-04-21 RX ADMIN — EPHEDRINE SULFATE 10 MG: 50 INJECTION INTRAVENOUS at 12:34

## 2025-04-21 RX ADMIN — MIDAZOLAM 1 MG: 1 INJECTION INTRAMUSCULAR; INTRAVENOUS at 11:20

## 2025-04-21 RX ADMIN — PROCHLORPERAZINE EDISYLATE 5 MG: 5 INJECTION, SOLUTION INTRAMUSCULAR; INTRAVENOUS at 08:33

## 2025-04-21 RX ADMIN — CEFAZOLIN 2 G: 2 INJECTION, POWDER, FOR SOLUTION INTRAMUSCULAR; INTRAVENOUS at 11:51

## 2025-04-21 RX ADMIN — PROCHLORPERAZINE EDISYLATE 5 MG: 5 INJECTION, SOLUTION INTRAMUSCULAR; INTRAVENOUS at 03:42

## 2025-04-21 RX ADMIN — GLYCOPYRROLATE 0.2 MG: 0.2 INJECTION, SOLUTION INTRAMUSCULAR; INTRAVENOUS at 12:23

## 2025-04-21 RX ADMIN — FAMOTIDINE 20 MG: 10 INJECTION, SOLUTION INTRAVENOUS at 11:21

## 2025-04-21 RX ADMIN — SODIUM CHLORIDE, POTASSIUM CHLORIDE, SODIUM LACTATE AND CALCIUM CHLORIDE 9 ML/HR: 600; 310; 30; 20 INJECTION, SOLUTION INTRAVENOUS at 11:20

## 2025-04-21 RX ADMIN — Medication 10 ML: at 00:45

## 2025-04-21 RX ADMIN — DEXAMETHASONE SODIUM PHOSPHATE 8 MG: 4 INJECTION, SOLUTION INTRAMUSCULAR; INTRAVENOUS at 12:18

## 2025-04-21 RX ADMIN — HYDROMORPHONE HYDROCHLORIDE 1 MG: 1 INJECTION, SOLUTION INTRAMUSCULAR; INTRAVENOUS; SUBCUTANEOUS at 03:42

## 2025-04-21 RX ADMIN — DIPHENHYDRAMINE HYDROCHLORIDE 25 MG: 50 INJECTION, SOLUTION INTRAMUSCULAR; INTRAVENOUS at 12:38

## 2025-04-21 RX ADMIN — PROCHLORPERAZINE EDISYLATE 5 MG: 5 INJECTION, SOLUTION INTRAMUSCULAR; INTRAVENOUS at 00:44

## 2025-04-21 RX ADMIN — KETOROLAC TROMETHAMINE 30 MG: 30 INJECTION, SOLUTION INTRAMUSCULAR at 12:53

## 2025-04-21 RX ADMIN — Medication 10 ML: at 03:42

## 2025-04-21 RX ADMIN — PROPOFOL 200 MG: 10 INJECTION, EMULSION INTRAVENOUS at 12:06

## 2025-04-21 RX ADMIN — HYDROMORPHONE HYDROCHLORIDE 1 MG: 1 INJECTION, SOLUTION INTRAMUSCULAR; INTRAVENOUS; SUBCUTANEOUS at 08:32

## 2025-04-21 NOTE — ANESTHESIA PREPROCEDURE EVALUATION
Anesthesia Evaluation     NPO Solid Status: > 8 hours             Airway   Mallampati: II  Dental      Pulmonary    (-) sleep apnea, not a smoker  Cardiovascular     (+) hypertension      Neuro/Psych  (+) psychiatric history  GI/Hepatic/Renal/Endo    (+) obesity, GERD    Musculoskeletal     Abdominal    Substance History      OB/GYN          Other                          Anesthesia Plan    ASA 2     general       Anesthetic plan, risks, benefits, and alternatives have been provided, discussed and informed consent has been obtained with: patient.        CODE STATUS:    Code Status (Patient has no pulse and is not breathing): CPR (Attempt to Resuscitate)  Medical Interventions (Patient has pulse or is breathing): Full Support  Level Of Support Discussed With: Patient

## 2025-04-21 NOTE — PLAN OF CARE
Problem: Adult Inpatient Plan of Care  Goal: Plan of Care Review  Outcome: Progressing  Flowsheets (Taken 4/21/2025 0534)  Progress: no change  Outcome Evaluation: Pt admitted for kidney stones, pending urology consult. Requiring round the clock pain and nausea meds. VSS  Plan of Care Reviewed With: patient  Goal: Patient-Specific Goal (Individualized)  Outcome: Progressing  Goal: Absence of Hospital-Acquired Illness or Injury  Outcome: Progressing  Intervention: Identify and Manage Fall Risk  Recent Flowsheet Documentation  Taken 4/21/2025 0412 by Leila Varela RN  Safety Promotion/Fall Prevention: safety round/check completed  Taken 4/21/2025 0341 by Leila Varela RN  Safety Promotion/Fall Prevention:   clutter free environment maintained   nonskid shoes/slippers when out of bed   room organization consistent   safety round/check completed  Taken 4/21/2025 0200 by Leila Varela RN  Safety Promotion/Fall Prevention:   nonskid shoes/slippers when out of bed   room organization consistent   safety round/check completed  Taken 4/21/2025 0048 by Leila Varela RN  Safety Promotion/Fall Prevention:   room organization consistent   safety round/check completed  Taken 4/20/2025 2352 by Leila Varela RN  Safety Promotion/Fall Prevention:   clutter free environment maintained   fall prevention program maintained   nonskid shoes/slippers when out of bed   room organization consistent   safety round/check completed  Taken 4/20/2025 2302 by Leila Varela RN  Safety Promotion/Fall Prevention:   clutter free environment maintained   nonskid shoes/slippers when out of bed   room organization consistent   safety round/check completed  Taken 4/20/2025 2211 by Leila Varela RN  Safety Promotion/Fall Prevention:   clutter free environment maintained   nonskid shoes/slippers when out of bed   room organization consistent   safety round/check completed  Taken 4/20/2025 2114 by Leila Varela RN  Safety  Promotion/Fall Prevention:   clutter free environment maintained   nonskid shoes/slippers when out of bed   room organization consistent   safety round/check completed  Taken 4/20/2025 2044 by Leila Varela RN  Safety Promotion/Fall Prevention:   clutter free environment maintained   fall prevention program maintained   lighting adjusted   nonskid shoes/slippers when out of bed   room organization consistent   safety round/check completed  Intervention: Prevent Skin Injury  Recent Flowsheet Documentation  Taken 4/21/2025 0412 by Leila Varela RN  Body Position: position changed independently  Taken 4/21/2025 0341 by Leila Varela RN  Body Position: position changed independently  Taken 4/21/2025 0200 by Leila Varela RN  Body Position: position changed independently  Taken 4/21/2025 0048 by Leila Varela RN  Body Position: position changed independently  Taken 4/20/2025 2352 by Leila Vareal RN  Body Position: position changed independently  Taken 4/20/2025 2302 by Leila Varela RN  Body Position: position changed independently  Taken 4/20/2025 2211 by Leila Varela RN  Body Position: position changed independently  Taken 4/20/2025 2114 by Leila Varela RN  Body Position: position changed independently  Taken 4/20/2025 2044 by Leila Varela RN  Body Position: position changed independently  Intervention: Prevent Infection  Recent Flowsheet Documentation  Taken 4/21/2025 0341 by Leila Varela RN  Infection Prevention:   equipment surfaces disinfected   personal protective equipment utilized   hand hygiene promoted   rest/sleep promoted   single patient room provided  Taken 4/21/2025 0048 by Leila Varela RN  Infection Prevention:   equipment surfaces disinfected   hand hygiene promoted   personal protective equipment utilized   rest/sleep promoted   single patient room provided  Taken 4/20/2025 2352 by Leila Varela RN  Infection Prevention:   equipment surfaces  disinfected   hand hygiene promoted   personal protective equipment utilized   rest/sleep promoted   single patient room provided  Taken 4/20/2025 2302 by Leila Varela RN  Infection Prevention:   hand hygiene promoted   personal protective equipment utilized   rest/sleep promoted   single patient room provided  Taken 4/20/2025 2211 by Leila Varela RN  Infection Prevention:   equipment surfaces disinfected   hand hygiene promoted   personal protective equipment utilized   rest/sleep promoted   single patient room provided  Taken 4/20/2025 2114 by Leila Varela RN  Infection Prevention:   equipment surfaces disinfected   hand hygiene promoted   personal protective equipment utilized   rest/sleep promoted   single patient room provided  Taken 4/20/2025 2044 by Leila Varela RN  Infection Prevention:   equipment surfaces disinfected   hand hygiene promoted   personal protective equipment utilized   rest/sleep promoted   single patient room provided  Goal: Optimal Comfort and Wellbeing  Outcome: Progressing  Intervention: Monitor Pain and Promote Comfort  Recent Flowsheet Documentation  Taken 4/21/2025 0412 by Leila Varela RN  Pain Management Interventions:   no interventions per patient request   quiet environment facilitated  Taken 4/21/2025 0342 by Leila Varela RN  Pain Management Interventions:   pain management plan reviewed with patient/caregiver   pain medication given   quiet environment facilitated   relaxation techniques promoted  Taken 4/21/2025 0048 by Leila Varela RN  Pain Management Interventions:   no interventions per patient request   quiet environment facilitated   relaxation techniques promoted  Taken 4/20/2025 2352 by Leila Varela RN  Pain Management Interventions:   pain medication given   cold applied   quiet environment facilitated  Taken 4/20/2025 2211 by Leila Varela RN  Pain Management Interventions:   no interventions per patient request   quiet  environment facilitated  Taken 4/20/2025 2141 by Leila Varela RN  Pain Management Interventions: pain medication given  Taken 4/20/2025 2114 by Leila Varela RN  Pain Management Interventions:   pain management plan reviewed with patient/caregiver   pillow support provided   quiet environment facilitated   relaxation techniques promoted   diversional activity provided   care clustered  Taken 4/20/2025 2044 by Leila Varela RN  Pain Management Interventions:   pain management plan reviewed with patient/caregiver   pain medication given   position adjusted   quiet environment facilitated   relaxation techniques promoted   care clustered   diversional activity provided  Intervention: Provide Person-Centered Care  Recent Flowsheet Documentation  Taken 4/21/2025 0341 by Leila Varela RN  Trust Relationship/Rapport:   care explained   thoughts/feelings acknowledged  Taken 4/20/2025 2114 by Leila Varela RN  Trust Relationship/Rapport:   care explained   choices provided   emotional support provided   empathic listening provided   questions answered   questions encouraged   reassurance provided   thoughts/feelings acknowledged  Goal: Readiness for Transition of Care  Outcome: Progressing  Intervention: Mutually Develop Transition Plan  Recent Flowsheet Documentation  Taken 4/20/2025 2057 by Leila Varela RN  Transportation Anticipated: family or friend will provide  Patient/Family Anticipated Services at Transition: none  Patient/Family Anticipates Transition to: home with family  Taken 4/20/2025 2054 by Leila Varela RN  Equipment Currently Used at Home: none   Goal Outcome Evaluation:  Plan of Care Reviewed With: patient        Progress: no change  Outcome Evaluation: Pt admitted for kidney stones, pending urology consult. Requiring round the clock pain and nausea meds. VSS

## 2025-04-21 NOTE — DISCHARGE SUMMARY
[facility] DISCHARGE SUMMARY      Patient Identification:  NAME:  Brittany Russo  Age:  39 y.o.   Sex:  female   :  1985   MRN:  [id number]       Date of admission:  2025   Date of discharge:     Admitting diagnosis:  R urolithiasis  Discharge diagnosis:  R urolithiasis  Admitting Physician:  Bry Gillis  Discharge Physican:  Bry Gillis    Procedures:  cysto, R stent, R ESWL    Hospital course:  admitted for aforementioned procedure. Discharged after this procedure.    Received cefazolin and Pt with known allergy to cephalexin., prescribed benadryl to be taken as needed. Also given benadryl intra op    Discharge medications:       Discharge Medications        New Medications        Instructions Start Date   diphenhydrAMINE 25 MG tablet  Commonly known as: Benadryl Allergy   25 mg, Oral, Every 6 Hours PRN      docusate sodium 100 MG capsule  Commonly known as: Colace   100 mg, Oral, Daily PRN      sulfamethoxazole-trimethoprim 800-160 MG per tablet  Commonly known as: Bactrim DS   1 tablet, Oral, 2 Times Daily, Begin taking the day before your stent may be pulled      traMADol 50 MG tablet  Commonly known as: Ultram   50 mg, Oral, Every 6 Hours PRN             Continue These Medications        Instructions Start Date   desvenlafaxine 50 MG 24 hr tablet  Commonly known as: PRISTIQ   50 mg, Oral, Daily      hydrOXYzine 25 MG tablet  Commonly known as: ATARAX   25 mg, Oral, 3 Times Daily PRN      nortriptyline 10 MG capsule  Commonly known as: Pamelor   10 mg, Oral, Nightly, Treatment for irritable bowel syndrome      pantoprazole 40 MG EC tablet  Commonly known as: PROTONIX   40 mg, Oral, Daily, for GERD      sucralfate 1 g tablet  Commonly known as: CARAFATE   TAKE 1 TABLET BY MOUTH FOUR TIMES DAILY BEFORE MEALS AND AT BEDTIME AS NEEDED FOR HEARTBURN OR GERD      valsartan 80 MG tablet  Commonly known as: DIOVAN   80 mg, Oral, Daily, for high blood pressure             Stop These Medications       ibuprofen 800 MG tablet  Commonly known as: ADVIL,MOTRIN

## 2025-04-21 NOTE — PROGRESS NOTES
ED OBSERVATION PROGRESS/DISCHARGE SUMMARY    Date of Admission: 4/20/2025   LOS: 0 days   PCP: Coty Newton MD    Final Diagnosis ureterolithiasis      Subjective     Hospital Outcome:     Patient is a pleasant afebrile ambulatory 39-year-old  female admitted to the ED observation unit for right sided CVA discomfort, has CT abdomen pelvis findings consistent with proximal right ureteral stone.  She has not had any kidney stones previously.  She denies any fevers or chills overnight.    Seen and evaluated by  with urology team who is recommended cystoscopy with right ureteral stenting and lithotripsy.  Patient has been n.p.o. since midnight.  She is agreeable to plan.    ROS:  General: no fevers, chills  Respiratory: no cough, dyspnea  Cardiovascular: no chest pain, palpitations  Abdomen: No abdominal pain, nausea, vomiting, or diarrhea  Neurologic: No focal weakness    Objective   Physical Exam:  I have reviewed the vital signs.  Temp:  [97.9 °F (36.6 °C)-98.6 °F (37 °C)] 97.9 °F (36.6 °C)  Heart Rate:  [86-98] 92  Resp:  [15-24] 15  BP: (132-190)/() 132/94  General Appearance:    Alert, cooperative, uncomfortable appearing but no distress  Head:    Normocephalic, atraumatic  Eyes:    Sclerae anicteric  Neck:   Supple, no mass  Lungs: Clear to auscultation bilaterally, respirations unlabored  Heart: Regular rate and rhythm, S1 and S2 normal, no murmur, rub or gallop  Abdomen:  Soft, nontender, bowel sounds active, moderate abdominal obesity present  Extremities: No clubbing, cyanosis, or edema to lower extremities  Pulses:  2+ and symmetric in distal lower extremities  Skin: No rashes   Neurologic: Oriented x3, Normal strength to extremities    Results Review:    I have reviewed the labs, radiology results and diagnostic studies.    Results from last 7 days   Lab Units 04/20/25  1723   WBC 10*3/mm3 7.75   HEMOGLOBIN g/dL 13.5   HEMATOCRIT % 41.1   PLATELETS 10*3/mm3 290     Results from  last 7 days   Lab Units 04/20/25  1723   SODIUM mmol/L 139   POTASSIUM mmol/L 3.8   CHLORIDE mmol/L 104   CO2 mmol/L 26.0   BUN mg/dL 10   CREATININE mg/dL 0.84   CALCIUM mg/dL 9.6   BILIRUBIN mg/dL 0.2   ALK PHOS U/L 113   ALT (SGPT) U/L 20   AST (SGOT) U/L 16   GLUCOSE mg/dL 104*     Imaging Results (Last 24 Hours)       Procedure Component Value Units Date/Time    CT Abdomen Pelvis Without Contrast [[id number]] Collected: 04/20/25 1814     Updated: 04/20/25 1821    Narrative:      CT ABDOMEN PELVIS WO CONTRAST-     INDICATIONS: Right flank pain     TECHNIQUE: Radiation dose reduction techniques were utilized, including  automated exposure control and exposure modulation based on body size.  Unenhanced ABDOMEN AND PELVIS CT     COMPARISON: None available     FINDINGS:     Cholelithiasis is demonstrated.     Fatty infiltration of the pancreas is seen.     A stone in the proximal right ureter measures 6 mm with mild right  hydronephrosis. Punctate nonobstructive left nephrolithiasis.  Intrauterine device is seen in the uterus.     The spleen is enlarged, 13.6 cm. The liver is also enlarged.     Otherwise unremarkable unenhanced appearance of the liver, spleen,  adrenal glands, pancreas, kidneys, bladder.     No bowel obstruction. Assessment of the colon for wall thickening is  limited by lack of distention. The appendix does not appear inflamed.     No free intraperitoneal gas or free fluid. Small umbilical hernia of fat  is present     Scattered small mesenteric and para-aortic lymph nodes are seen that are  not significant by size criteria.     Abdominal aorta is not aneurysmal. Aortic and other arterial  calcifications are present.     The lung bases minimal likely atelectasis or scarring in the lingula.     Degenerative changes are seen in the spine. No acute fracture is  identified.             Impression:            1. A 6 mm stone in the proximal right ureter with mild right  hydronephrosis. Punctate  nonobstructive left nephrolithiasis.     2. Cholelithiasis. Hepatosplenomegaly. Fatty infiltration of the  pancreas.     3. No acute inflammatory process of bowel is identified, follow-up as  indications persist.     This report was finalized on 4/20/2025 6:18 PM by Dr. Tripp Santiago M.D on Workstation: [id number]               I have reviewed the medications.     Discharge Medications        ASK your doctor about these medications        Instructions Start Date   desvenlafaxine 50 MG 24 hr tablet  Commonly known as: PRISTIQ   50 mg, Oral, Daily      hydrOXYzine 25 MG tablet  Commonly known as: ATARAX   25 mg, Oral, 3 Times Daily PRN      ibuprofen 800 MG tablet  Commonly known as: ADVIL,MOTRIN   800 mg, Oral, Every 8 Hours PRN      nortriptyline 10 MG capsule  Commonly known as: Pamelor   10 mg, Oral, Nightly, Treatment for irritable bowel syndrome      pantoprazole 40 MG EC tablet  Commonly known as: PROTONIX   40 mg, Oral, Daily, for GERD      sucralfate 1 g tablet  Commonly known as: CARAFATE   TAKE 1 TABLET BY MOUTH FOUR TIMES DAILY BEFORE MEALS AND AT BEDTIME AS NEEDED FOR HEARTBURN OR GERD      valsartan 80 MG tablet  Commonly known as: DIOVAN   80 mg, Oral, Daily, for high blood pressure              ---------------------------------------------------------------------------------------------  Assessment & Plan   Assessment/Problem List    Kidney stone      Plan:    Kidney stone  -Labs grossly unremarkable  -CT scan of the abdomen and pelvis reported 6 mm stone in the proximal right ureter with mild right hydronephrosis.  -Urinalysis trace leukocytes without bacteria or white blood cells, yeast present, Diflucan 100 mg ordered  -Analgesic medication for pain control  -Lidocaine infusion given in ED  -N.p.o. after midnight  - Urology consulted, plan for OR today     Hypertension  Vital signs every 4 hours and as needed  Continue home medication of valsartan     GERD  Continue home medications of Protonix  and Carafate     Anxiety/depression  Continue home medications of Pristiq and Pamelor    Disposition: to OR    This note will serve as a transfer and progress note    Marina Shipley, APRN 04/21/25 07:01 EDT    I have worn appropriate PPE during this patient encounter, sanitized my hands both with entering and exiting patient's room.      55 minutes has been spent by [facility] providers in the care of this patient while under observation status on this date 04/21/25

## 2025-04-21 NOTE — ANESTHESIA POSTPROCEDURE EVALUATION
"Patient: Brittany Russo    Procedure Summary       Date: 04/21/25 Room / Location: [facility] OR [address] [place] [address] MAIN OR    Anesthesia Start: 1158 Anesthesia Stop: 1310    Procedure: RIGHT EXTRACORPOREAL SHOCKWAVE LITHOTRIPSY WITH STENT INSERTION (Right) Diagnosis:       Kidney stone      (Kidney stone [N20.0])    Surgeons: Bry Gillis MD Provider: Murphy Mclean MD    Anesthesia Type: general ASA Status: 2            Anesthesia Type: general    Vitals  Vitals Value Taken Time   /80 04/21/25 14:15   Temp 36.7 °C (98 °F) 04/21/25 13:07   Pulse 96 04/21/25 14:21   Resp 12 04/21/25 14:00   SpO2 91 % 04/21/25 14:21   Vitals shown include unfiled device data.        Post Anesthesia Care and Evaluation    Pain management: adequate    Airway patency: patent  Anesthetic complications: No anesthetic complications    Cardiovascular status: acceptable  Respiratory status: acceptable  Hydration status: acceptable    Comments: /97   Pulse 99   Temp 36.7 °C (98 °F) (Oral)   Resp 12   Ht 160 cm (63\")   Wt 90.7 kg (200 lb)   LMP  (LMP Unknown)   SpO2 91%   BMI 35.43 kg/m²       "

## 2025-04-21 NOTE — PROGRESS NOTES
This is a patient that was admitted to the emergency department for further evaluation and management of a kidney stone.  When I am seeing this patient this morning still having some pain but is improved.  Urology has seen the patient.  The plan is to take her to surgery later today.  She has right flank pain rating to the right side of her abdomen she has a 6 mm stone in the right ureter with hydronephrosis.  She reports no fevers or chills no dysuria or urinary frequency.  On my exam she is alert and oriented vital signs are unremarkable she is in no respiratory distress her heart is regular rate and rhythm  Abdomen is obese soft there is some tenderness to the right side of her abdomen to the right flank and the right lower quadrant.  Normal inspection.  There is no guarding or rebound  The plan is for the patient to go to surgery later today he has been seen by urology.  Informed patient and family member at bedside of the treatment plan.  All questions answered.

## 2025-04-21 NOTE — CONSULTS
FIRST UROLOGY CONSULT      Patient Identification:  NAME:  Brittany Russo  Age:  39 y.o.   Sex:  female   :  1985   MRN:  [id number]     Chief complaint: Right urolithiasis    History of present illness:      39-year-old female with right urolithiasis.  Came in with nausea vomiting, right lower quadrant pain radiating to right flank pain.  No fevers or chills.  Never had a stone before.  Not on blood thinners or antiplatelets.  Has not been taking ibuprofen.    In hospital:  -AVSS, good UOP  -WBC -7  -Creat -0.84  -UA -RBC, trace leuk, no bacteria, 0 WBC    -CT -independently interpreted; 5 mm proximal right ureteral stone with hydronephrosis      Asked to see    Past medical history:  Past Medical History:   Diagnosis Date    Allergic     Anxiety     Depression     GERD (gastroesophageal reflux disease)     Heart murmur     Hiatal hernia     Hypertension 20 years ago    Initial patient encounter 2020    Irritable bowel syndrome     Normal pregnancy 2020    Scoliosis        Past surgical history:  Past Surgical History:   Procedure Laterality Date     SECTION  2017 and 3-3-2021    COLONOSCOPY  approx     pt does not recall results     INTRAUTERINE DEVICE INSERTION  2025    Mirena    TONSILLECTOMY AND ADENOIDECTOMY      UPPER GASTROINTESTINAL ENDOSCOPY  approx     patient does not recall results     UVULECTOMY      WISDOM TOOTH EXTRACTION         Allergies:  Cephalexin, Hydrocodone, Oxycodone, and Penicillins    Home medications:  Medications Prior to Admission   Medication Sig Dispense Refill Last Dose/Taking    desvenlafaxine (PRISTIQ) 50 MG 24 hr tablet Take 1 tablet by mouth Daily. Indications: Major Depressive Disorder 90 tablet 3 2025 Evening    hydrOXYzine (ATARAX) 25 MG tablet Take 1 tablet by mouth 3 (Three) Times a Day As Needed for Itching. Indications: Feeling Anxious, Feeling Tense 90 tablet 3 2025 Evening    ibuprofen (ADVIL,MOTRIN) 800 MG  tablet Take 1 tablet by mouth Every 8 (Eight) Hours As Needed for Mild Pain. 15 tablet 0 4/19/2025 Evening    nortriptyline (Pamelor) 10 MG capsule Take 1 capsule by mouth Every Night. Treatment for irritable bowel syndrome 90 capsule 3 4/19/2025 Evening    pantoprazole (PROTONIX) 40 MG EC tablet TAKE 1 TABLET BY MOUTH DAILY FOR GERD 30 tablet 0 4/19/2025 Evening    sucralfate (CARAFATE) 1 g tablet TAKE 1 TABLET BY MOUTH FOUR TIMES DAILY BEFORE MEALS AND AT BEDTIME AS NEEDED FOR HEARTBURN OR GERD 90 tablet 0 4/19/2025 Evening    valsartan (DIOVAN) 80 MG tablet TAKE 1 TABLET BY MOUTH DAILY FOR HIGH BLOOD PRESSURE 90 tablet 3 4/19/2025 Evening        Hospital medications:  desvenlafaxine, 50 mg, Oral, Nightly  fluconazole, 100 mg, Oral, Once  nortriptyline, 10 mg, Oral, Nightly  pantoprazole, 40 mg, Oral, Nightly  sodium chloride, 10 mL, Intravenous, Q12H  sucralfate, 1 g, Oral, 4x Daily AC & at Bedtime  valsartan, 80 mg, Oral, Nightly           HYDROmorphone    ondansetron ODT **OR** ondansetron    prochlorperazine    [COMPLETED] Insert Peripheral IV **AND** sodium chloride    sodium chloride    sodium chloride    Family history:  Family History   Problem Relation Age of Onset    Memory loss Mother     Hyperlipidemia Father     Diabetes type II Father     Hypertension Father     Irritable bowel syndrome Father     Early death Sister     Stroke Sister     Factor V Leiden deficiency Sister     Hypertension Brother     Hypertension Brother     Dementia Maternal Grandmother     Stroke Maternal Grandfather 70    Diabetes type I Nephew     Breast cancer Neg Hx     Colon cancer Neg Hx        Social history:  Social History     Tobacco Use    Smoking status: Never    Smokeless tobacco: Never   Vaping Use    Vaping status: Never Used   Substance Use Topics    Alcohol use: No    Drug use: No       Review of systems:      Positive for:  nothing  Negative for:  chest pain, cough, sob, o/w neg    Objective:  TMax 24 hours:   Temp  (24hrs), Av.2 °F (36.8 °C), Min:97.9 °F (36.6 °C), Max:98.6 °F (37 °C)      Vitals Ranges:   Temp:  [97.9 °F (36.6 °C)-98.6 °F (37 °C)] 98 °F (36.7 °C)  Heart Rate:  [86-98] 92  Resp:  [15-24] 16  BP: (101-190)/() 101/77    Intake/Output Last 3 shifts:  I/O last 3 completed shifts:  In: 1150 [P.O.:150; IV Piggyback:1000]  Out: 430 [Urine:400; Emesis/NG output:30]     Physical Exam:    General Appearance:    Alert, cooperative, NAD   Back:     No CVA tenderness   Lungs:     Respirations unlabored, no wheezing    Heart:    RRR, intact peripheral pulses   Abdomen:     Soft, NDNT, no masses, no guarding   :    Pelvic not performed, bladder nondistended and nontender   Neuro/Psych:   Orientation intact, mood/affect pleasant       Results review:   I reviewed the patient's new clinical results.    Data review:  Lab Results (last 24 hours)       Procedure Component Value Units Date/Time    Urinalysis, Microscopic Only - Urine, Clean Catch [[id number]]  (Abnormal) Collected: 25    Specimen: Urine, Clean Catch Updated: 25     RBC, UA 21-50 /HPF      WBC, UA 0-2 /HPF      Comment: Urine culture not indicated.        Bacteria, UA None Seen /HPF      Squamous Epithelial Cells, UA 7-12 /HPF      Yeast, UA Present /HPF      Hyaline Casts, UA None Seen /LPF      Calcium Oxalate Crystals, UA Present /HPF      Methodology Automated Microscopy    Urinalysis With Culture If Indicated - Urine, Clean Catch [[id number]]  (Abnormal) Collected: 25    Specimen: Urine, Clean Catch Updated: 25     Color, UA Yellow     Appearance, UA Cloudy     pH, UA 6.0     Specific Gravity, UA 1.023     Glucose, UA Negative     Ketones, UA Trace     Bilirubin, UA Negative     Blood, UA Moderate (2+)     Protein, UA Trace     Leuk Esterase, UA Trace     Nitrite, UA Negative     Urobilinogen, UA 0.2 E.U./dL    Narrative:      In absence of clinical symptoms, the presence of pyuria, bacteria, and/or  nitrites on the urinalysis result does not correlate with infection.    hCG, Serum, Qualitative [[id number]]  (Normal) Collected: 04/20/25 1723    Specimen: Blood Updated: 04/20/25 1756     HCG Qualitative Negative    Comprehensive Metabolic Panel [[id number]]  (Abnormal) Collected: 04/20/25 1723    Specimen: Blood Updated: 04/20/25 1753     Glucose 104 mg/dL      BUN 10 mg/dL      Creatinine 0.84 mg/dL      Sodium 139 mmol/L      Potassium 3.8 mmol/L      Chloride 104 mmol/L      CO2 26.0 mmol/L      Calcium 9.6 mg/dL      Total Protein 7.5 g/dL      Albumin 4.2 g/dL      ALT (SGPT) 20 U/L      AST (SGOT) 16 U/L      Alkaline Phosphatase 113 U/L      Total Bilirubin 0.2 mg/dL      Globulin 3.3 gm/dL      A/G Ratio 1.3 g/dL      BUN/Creatinine Ratio 11.9     Anion Gap 9.0 mmol/L      eGFR 90.2 mL/min/1.73     Narrative:      GFR Categories in Chronic Kidney Disease (CKD)      GFR Category          GFR (mL/min/1.73)    Interpretation  G1                     90 or greater         Normal or high (1)  G2                      60-89                Mild decrease (1)  G3a                   45-59                Mild to moderate decrease  G3b                   30-44                Moderate to severe decrease  G4                    15-29                Severe decrease  G5                    14 or less           Kidney failure          (1)In the absence of evidence of kidney disease, neither GFR category G1 or G2 fulfill the criteria for CKD.    eGFR calculation 2021 CKD-EPI creatinine equation, which does not include race as a factor    CBC & Differential [[id number]]  (Normal) Collected: 04/20/25 1723    Specimen: Blood Updated: 04/20/25 1733    Narrative:      The following orders were created for panel order CBC & Differential.  Procedure                               Abnormality         Status                     ---------                               -----------         ------                     CBC Auto  Differential[[id number]]        Normal              Final result                 Please view results for these tests on the individual orders.    CBC Auto Differential [[id number]]  (Normal) Collected: 04/20/25 1723    Specimen: Blood Updated: 04/20/25 1733     WBC 7.75 10*3/mm3      RBC 4.68 10*6/mm3      Hemoglobin 13.5 g/dL      Hematocrit 41.1 %      MCV 87.8 fL      MCH 28.8 pg      MCHC 32.8 g/dL      RDW 13.2 %      RDW-SD 42.4 fl      MPV 10.1 fL      Platelets 290 10*3/mm3      Neutrophil % 67.7 %      Lymphocyte % 23.4 %      Monocyte % 5.9 %      Eosinophil % 2.3 %      Basophil % 0.6 %      Immature Grans % 0.1 %      Neutrophils, Absolute 5.24 10*3/mm3      Lymphocytes, Absolute 1.81 10*3/mm3      Monocytes, Absolute 0.46 10*3/mm3      Eosinophils, Absolute 0.18 10*3/mm3      Basophils, Absolute 0.05 10*3/mm3      Immature Grans, Absolute 0.01 10*3/mm3      nRBC 0.0 /100 WBC              Imaging:  Imaging Results (Last 24 Hours)       Procedure Component Value Units Date/Time    CT Abdomen Pelvis Without Contrast [[id number]] Collected: 04/20/25 1814     Updated: 04/20/25 1821    Narrative:      CT ABDOMEN PELVIS WO CONTRAST-     INDICATIONS: Right flank pain     TECHNIQUE: Radiation dose reduction techniques were utilized, including  automated exposure control and exposure modulation based on body size.  Unenhanced ABDOMEN AND PELVIS CT     COMPARISON: None available     FINDINGS:     Cholelithiasis is demonstrated.     Fatty infiltration of the pancreas is seen.     A stone in the proximal right ureter measures 6 mm with mild right  hydronephrosis. Punctate nonobstructive left nephrolithiasis.  Intrauterine device is seen in the uterus.     The spleen is enlarged, 13.6 cm. The liver is also enlarged.     Otherwise unremarkable unenhanced appearance of the liver, spleen,  adrenal glands, pancreas, kidneys, bladder.     No bowel obstruction. Assessment of the colon for wall thickening is  limited by lack  of distention. The appendix does not appear inflamed.     No free intraperitoneal gas or free fluid. Small umbilical hernia of fat  is present     Scattered small mesenteric and para-aortic lymph nodes are seen that are  not significant by size criteria.     Abdominal aorta is not aneurysmal. Aortic and other arterial  calcifications are present.     The lung bases minimal likely atelectasis or scarring in the lingula.     Degenerative changes are seen in the spine. No acute fracture is  identified.             Impression:            1. A 6 mm stone in the proximal right ureter with mild right  hydronephrosis. Punctate nonobstructive left nephrolithiasis.     2. Cholelithiasis. Hepatosplenomegaly. Fatty infiltration of the  pancreas.     3. No acute inflammatory process of bowel is identified, follow-up as  indications persist.     This report was finalized on 4/20/2025 6:18 PM by Dr. Tripp Santiago M.D on Workstation: [id number]                Assessment:     39-year-old female with    1.  Right urolithiasis  2.  Right hydronephrosis    Plan:     -N.p.o.  - RBL explained to patient and consent obtained  - Cystoscopy, right stent, right ESWL  - Preop Kefzol    Bry Gillis MD  04/21/25  07:35 EDT

## 2025-04-21 NOTE — ANESTHESIA PROCEDURE NOTES
Airway  Reason: elective    Date/Time: 4/21/2025 12:08 PM  Airway not difficult    General Information and Staff    Patient location during procedure: OR  Anesthesiologist: Murphy Mclean MD  CRNA/CAA: Jd Jefferson CRNA    Indications and Patient Condition  Indications for airway management: airway protection    Preoxygenated: yes  MILS not maintained throughout    Mask difficulty assessment: 0 - not attempted    Final Airway Details    Final airway type: supraglottic airway      Successful airway: classic  Size: 4   Number of attempts at approach: 1  Assessment: lips, teeth, and gum same as pre-op

## 2025-04-22 ENCOUNTER — TELEPHONE (OUTPATIENT)
Dept: FAMILY MEDICINE CLINIC | Facility: CLINIC | Age: 40
End: 2025-04-22

## 2025-04-22 ENCOUNTER — TELEMEDICINE (OUTPATIENT)
Dept: FAMILY MEDICINE CLINIC | Facility: CLINIC | Age: 40
End: 2025-04-22
Payer: COMMERCIAL

## 2025-04-22 ENCOUNTER — TRANSITIONAL CARE MANAGEMENT TELEPHONE ENCOUNTER (OUTPATIENT)
Dept: CALL CENTER | Facility: HOSPITAL | Age: 40
End: 2025-04-22
Payer: COMMERCIAL

## 2025-04-22 DIAGNOSIS — Z96.0 S/P URETERAL STENT PLACEMENT: Primary | ICD-10-CM

## 2025-04-22 DIAGNOSIS — N20.1 RIGHT URETERAL STONE: ICD-10-CM

## 2025-04-22 RX ORDER — SUCRALFATE 1 G/1
TABLET ORAL
Qty: 90 TABLET | Refills: 0 | Status: SHIPPED | OUTPATIENT
Start: 2025-04-22

## 2025-04-22 RX ORDER — TRAMADOL HYDROCHLORIDE 50 MG/1
50 TABLET ORAL EVERY 6 HOURS PRN
Qty: 20 TABLET | Refills: 0 | Status: SHIPPED | OUTPATIENT
Start: 2025-04-22 | End: 2025-04-27

## 2025-04-22 RX ORDER — HYDROXYZINE HYDROCHLORIDE 25 MG/1
25 TABLET, FILM COATED ORAL 3 TIMES DAILY PRN
Qty: 90 TABLET | Refills: 3 | Status: SHIPPED | OUTPATIENT
Start: 2025-04-22

## 2025-04-22 NOTE — OUTREACH NOTE
Call Center TCM Note      Flowsheet Row Responses   [facility] patient discharged from? [place]   Does the patient have one of the following disease processes/diagnoses(primary or secondary)? Other   TCM attempt successful? Yes   Discharge diagnosis Kidney stone,  RIGHT EXTRACORPOREAL SHOCKWAVE LITHOTRIPSY WITH STENT INSERTION   TCM call completed? Yes   Wrap up additional comments D/C DX: Kidney stone, RIGHT EXTRACORPOREAL SHOCKWAVE LITHOTRIPSY WITH STENT INSERTION - Pt discharged home 04/21, today completed VIDEO VISIT with PCP Dr Lamar DIAZ - Medical Assistant    4/22/2025, 16:27 EDT

## 2025-04-22 NOTE — OUTREACH NOTE
Call Center TCM Note      Flowsheet Row Responses   [facility] patient discharged from? [place]   Does the patient have one of the following disease processes/diagnoses(primary or secondary)? Other   TCM attempt successful? No   Unsuccessful attempts Attempt 1            MILA DIAZ - Medical Assistant    4/22/2025, 14:17 EDT         [[employer] e 2008 model, 10-12 MHz frequencies] : multiple real time longitudinal and transverse images were obtained using a high resolution ultrasound with a linear transducer, [employer] e 2008 model, 10-12 MHz frequencies. All measurements will be reported as longitudinal x scott-posterior x transverse. [Report dated ___] : Report dated [unfilled] [Multinodular Goiter] : multinodular goiter [] : a heterogeneous parenchyma  [Left Thyroid] : left [Lower] : lower pole there is a  [Isoechoic solid component] : with isoechoic solid component [Macrocalcifications] : macrocalcifications [Peripheral and scant  internal vascularity] : peripheral and scant internal vascularity [Predominantly Solid] : predominantly solid [Ovoid] : ovoid in shape [No calcifications] : no calcifications [No vascularity] : no vascularity [Right Thyroid] : right [Mid] : mid pole there is a  [Mixed] : mixed [Round] : round in shape [Smooth] : smooth [Yes] : has a halo [Peripheral vascularity] : peripheral vascularity [FreeTextEntry1] : 4.83 x 1.33 x 1.81 [FreeTextEntry5] : 3.42 x 1.40 x 1.57 [FreeTextEntry2] : 0.34 [FreeTextEntry3] : 0.74 x 1.06 x 0.83

## 2025-04-22 NOTE — LETTER
April 22, 2025     Patient: Brittany Russo   YOB: 1985   Date of Visit: 4/22/2025       To Whom It May Concern:    It is my medical opinion that Brittany Russo be off work from 4/21/25-4/25/25.            Sincerely,        Coty Newton MD    CC: No Recipients

## 2025-04-22 NOTE — TELEPHONE ENCOUNTER
Message from Rayna Frank.  Hi, my name is Brittany Russo. I am calling because I was admitted to the ER on Sunday, 04/20. I had a kidney stone. They sent me home yesterday afternoon with a stent. They did lithotripsy and tried to break up the stone and then put in the stent to try to help the stone pass or the smaller pieces of the stone, I suppose. And I'm just very uncomfortable at home with the stent and having to constantly go to the bathroom. They did give me pain medication, but only for up to five days. It helps a little, but not much. I can't go to work or leave the house because I'm so uncomfortable with the stent. I was just trying to figure out what my next steps would be. The doctor that put it in was Dr. Coulter. If somebody could give me a call back and let me know what my next steps would be, I would appreciate it. They wanted the stent in for up to two weeks, and I don't know how I'm going to do that with how uncomfortable I am just the first 24 hours. If somebody could give me a call back and let me know what we might do about all of this, I would appreciate it. Thank you so much. Bye-bye.

## 2025-04-22 NOTE — PROGRESS NOTES
This provider is located in [place], [place] for [facility] (through [facility]), [address], [facility] [address] using a secure [employer] Video Visit through [employer]. Patient is being seen remotely via telehealth at home in [place] and stated they are in a secure environment for this session. The patient's condition being diagnosed/treated is appropriate for telemedicine. The provider identified herself as well as her credentials.. The patient, and/or patients guardian, consent to be seen remotely, and when consent is given they understand that the consent allows for patient identifiable information to be sent to a third party as needed. They may refuse to be seen remotely at any time. The electronic data is encrypted and password protected, and the patient and/or guardian has been advised of the potential risks to privacy not withstanding such measures.     This visit was conducted with use of [employer] audio and video telecommunication system with real time communication between the patient and the provider. Patient consent for virtual visit obtained on 04/22/2025.      Transitional Care Follow Up Visit  Subjective     Brittany Russo is a 39 y.o. female who presents for a transitional care management visit.    Within 48 business hours after discharge our office contacted her via telephone to coordinate her care and needs.      I reviewed and discussed the details of that call along with the discharge summary, hospital problems, inpatient lab results, inpatient diagnostic studies, and consultation reports with Brittany.     Current outpatient and discharge medications have been reconciled for the patient.  Reviewed by: Coty Newton MD          4/21/2025    10:21 PM   Date of TCM Phone Call   [facility]   Date of Admission 4/20/2025   Date of Discharge 4/21/2025   Discharge Disposition Home or Self Care     Risk for Readmission  (LACE) Score: 1 (4/21/2025  6:00 AM)      History of Present Illness   Course During Hospital Stay:  On Easter experienced R sided pain wrapping around her hip to her back. Went to the ER, diagnosed with kidney stone. She was admitted because urology was not available until the next day. Then they did a lithotripsy and stent placement. She was dc'd home after the procedure with tramadol for pain control, stool softener, and antibiotic.     Currently pain is not well controlled, has to take tramadol every 6 hours. No nausea vomiting, frequent urination, drinking lots of water and gatorade. She is waiting to hear back from urology office for when her followup will be scheduled.      The following portions of the patient's history were reviewed and updated as appropriate: allergies, current medications, past family history, past medical history, past social history, past surgical history, and problem list.    Review of Systems    Objective     Physical Exam  Constitutional:       Appearance: Normal appearance. She is not toxic-appearing or diaphoretic.   Neurological:      Mental Status: She is alert.         Assessment & Plan   Diagnoses and all orders for this visit:    1. S/P ureteral stent placement (Primary)  Comments:  Continued pain, will give extra 5 days of tramadol. Urology followup pending. Will give note for work. Encouraged hydration, gentle diet advancement.  Orders:  -     traMADol (Ultram) 50 MG tablet; Take 1 tablet by mouth Every 6 (Six) Hours As Needed for Severe Pain for up to 5 days. Indications: Pain  Dispense: 20 tablet; Refill: 0    2. Right ureteral stone  -     traMADol (Ultram) 50 MG tablet; Take 1 tablet by mouth Every 6 (Six) Hours As Needed for Severe Pain for up to 5 days. Indications: Pain  Dispense: 20 tablet; Refill: 0        Patient voiced understanding and agreement with plan of care and had no further questions or concerns at this time.     Coty Newton MD  Family Medicine  [facility]  [facility]

## 2025-04-22 NOTE — OUTREACH NOTE
Prep Survey      Flowsheet Row Responses   [facility] patient discharged from? [place]   Is LACE score < 7 ? Yes   Eligibility [facility]   Date of Admission 04/20/25   Date of Discharge 04/21/25   Discharge Disposition Home or Self Care   Discharge diagnosis Kidney stone,  RIGHT EXTRACORPOREAL SHOCKWAVE LITHOTRIPSY WITH STENT INSERTION   Does the patient have one of the following disease processes/diagnoses(primary or secondary)? Other   Does the patient have Home health ordered? No   Is there a DME ordered? No   Prep survey completed? Yes            Eladia Zabala Registered Nurse           Provider Procedure Text (C): After obtaining clear surgical margins the defect was repaired by another provider.

## 2025-04-29 DIAGNOSIS — E66.813 CLASS 3 SEVERE OBESITY DUE TO EXCESS CALORIES WITH SERIOUS COMORBIDITY AND BODY MASS INDEX (BMI) OF 40.0 TO 44.9 IN ADULT: ICD-10-CM

## 2025-04-29 DIAGNOSIS — K21.9 GASTROESOPHAGEAL REFLUX DISEASE WITHOUT ESOPHAGITIS: ICD-10-CM

## 2025-04-29 DIAGNOSIS — E66.01 CLASS 3 SEVERE OBESITY DUE TO EXCESS CALORIES WITH SERIOUS COMORBIDITY AND BODY MASS INDEX (BMI) OF 40.0 TO 44.9 IN ADULT: ICD-10-CM

## 2025-04-29 RX ORDER — PANTOPRAZOLE SODIUM 40 MG/1
40 TABLET, DELAYED RELEASE ORAL DAILY
Qty: 30 TABLET | Refills: 0 | Status: SHIPPED | OUTPATIENT
Start: 2025-04-29

## 2025-05-17 DIAGNOSIS — E66.01 CLASS 3 SEVERE OBESITY DUE TO EXCESS CALORIES WITH SERIOUS COMORBIDITY AND BODY MASS INDEX (BMI) OF 40.0 TO 44.9 IN ADULT: ICD-10-CM

## 2025-05-17 DIAGNOSIS — K21.9 GASTROESOPHAGEAL REFLUX DISEASE WITHOUT ESOPHAGITIS: ICD-10-CM

## 2025-05-17 DIAGNOSIS — E66.813 CLASS 3 SEVERE OBESITY DUE TO EXCESS CALORIES WITH SERIOUS COMORBIDITY AND BODY MASS INDEX (BMI) OF 40.0 TO 44.9 IN ADULT: ICD-10-CM

## 2025-05-19 RX ORDER — SUCRALFATE 1 G/1
TABLET ORAL
Qty: 90 TABLET | Refills: 0 | Status: SHIPPED | OUTPATIENT
Start: 2025-05-19

## 2025-05-27 DIAGNOSIS — E66.813 CLASS 3 SEVERE OBESITY DUE TO EXCESS CALORIES WITH SERIOUS COMORBIDITY AND BODY MASS INDEX (BMI) OF 40.0 TO 44.9 IN ADULT: ICD-10-CM

## 2025-05-27 DIAGNOSIS — E66.01 CLASS 3 SEVERE OBESITY DUE TO EXCESS CALORIES WITH SERIOUS COMORBIDITY AND BODY MASS INDEX (BMI) OF 40.0 TO 44.9 IN ADULT: ICD-10-CM

## 2025-05-27 DIAGNOSIS — K21.9 GASTROESOPHAGEAL REFLUX DISEASE WITHOUT ESOPHAGITIS: ICD-10-CM

## 2025-05-28 RX ORDER — PANTOPRAZOLE SODIUM 40 MG/1
40 TABLET, DELAYED RELEASE ORAL DAILY
Qty: 30 TABLET | Refills: 0 | Status: SHIPPED | OUTPATIENT
Start: 2025-05-28

## 2025-06-05 RX ORDER — NORTRIPTYLINE HYDROCHLORIDE 10 MG/1
20 CAPSULE ORAL NIGHTLY
Qty: 180 CAPSULE | Refills: 3 | Status: SHIPPED | OUTPATIENT
Start: 2025-06-05

## 2025-06-17 ENCOUNTER — ANESTHESIA (OUTPATIENT)
Dept: GASTROENTEROLOGY | Facility: HOSPITAL | Age: 40
End: 2025-06-17
Payer: COMMERCIAL

## 2025-06-17 ENCOUNTER — HOSPITAL ENCOUNTER (OUTPATIENT)
Facility: HOSPITAL | Age: 40
Setting detail: HOSPITAL OUTPATIENT SURGERY
Discharge: HOME | End: 2025-06-17
Attending: INTERNAL MEDICINE | Admitting: INTERNAL MEDICINE
Payer: COMMERCIAL

## 2025-06-17 ENCOUNTER — ANESTHESIA EVENT (OUTPATIENT)
Dept: GASTROENTEROLOGY | Facility: HOSPITAL | Age: 40
End: 2025-06-17
Payer: COMMERCIAL

## 2025-06-17 VITALS
HEART RATE: 92 BPM | OXYGEN SATURATION: 96 % | RESPIRATION RATE: 18 BRPM | SYSTOLIC BLOOD PRESSURE: 139 MMHG | WEIGHT: 221.9 LBS | BODY MASS INDEX: 39.32 KG/M2 | DIASTOLIC BLOOD PRESSURE: 95 MMHG | HEIGHT: 63 IN

## 2025-06-17 DIAGNOSIS — K62.5 RECTAL BLEEDING: ICD-10-CM

## 2025-06-17 DIAGNOSIS — R13.10 DYSPHAGIA, UNSPECIFIED TYPE: ICD-10-CM

## 2025-06-17 DIAGNOSIS — K52.9 CHRONIC DIARRHEA: ICD-10-CM

## 2025-06-17 DIAGNOSIS — R10.13 DYSPEPSIA: ICD-10-CM

## 2025-06-17 PROCEDURE — 25010000002 LIDOCAINE 2% SOLUTION: Performed by: NURSE ANESTHETIST, CERTIFIED REGISTERED

## 2025-06-17 PROCEDURE — 25810000003 LACTATED RINGERS PER 1000 ML: Performed by: INTERNAL MEDICINE

## 2025-06-17 PROCEDURE — 88305 TISSUE EXAM BY PATHOLOGIST: CPT | Performed by: INTERNAL MEDICINE

## 2025-06-17 PROCEDURE — 43239 EGD BIOPSY SINGLE/MULTIPLE: CPT | Performed by: INTERNAL MEDICINE

## 2025-06-17 PROCEDURE — 45380 COLONOSCOPY AND BIOPSY: CPT | Performed by: INTERNAL MEDICINE

## 2025-06-17 PROCEDURE — 81025 URINE PREGNANCY TEST: CPT | Performed by: INTERNAL MEDICINE

## 2025-06-17 PROCEDURE — 25010000002 GLYCOPYRROLATE 0.2 MG/ML SOLUTION: Performed by: NURSE ANESTHETIST, CERTIFIED REGISTERED

## 2025-06-17 PROCEDURE — 25010000002 PROPOFOL 10 MG/ML EMULSION: Performed by: NURSE ANESTHETIST, CERTIFIED REGISTERED

## 2025-06-17 RX ORDER — GLYCOPYRROLATE 0.2 MG/ML
INJECTION INTRAMUSCULAR; INTRAVENOUS AS NEEDED
Status: DISCONTINUED | OUTPATIENT
Start: 2025-06-17 | End: 2025-06-17 | Stop reason: SURG

## 2025-06-17 RX ORDER — SODIUM CHLORIDE, SODIUM LACTATE, POTASSIUM CHLORIDE, CALCIUM CHLORIDE 600; 310; 30; 20 MG/100ML; MG/100ML; MG/100ML; MG/100ML
30 INJECTION, SOLUTION INTRAVENOUS CONTINUOUS PRN
Status: ACTIVE | OUTPATIENT
Start: 2025-06-17 | End: 2025-06-17

## 2025-06-17 RX ORDER — PROPOFOL 10 MG/ML
VIAL (ML) INTRAVENOUS AS NEEDED
Status: DISCONTINUED | OUTPATIENT
Start: 2025-06-17 | End: 2025-06-17 | Stop reason: SURG

## 2025-06-17 RX ORDER — LIDOCAINE HYDROCHLORIDE 20 MG/ML
INJECTION, SOLUTION INFILTRATION; PERINEURAL AS NEEDED
Status: DISCONTINUED | OUTPATIENT
Start: 2025-06-17 | End: 2025-06-17 | Stop reason: SURG

## 2025-06-17 RX ADMIN — PROPOFOL 60 MG: 10 INJECTION, EMULSION INTRAVENOUS at 10:04

## 2025-06-17 RX ADMIN — PROPOFOL 180 MCG/KG/MIN: 10 INJECTION, EMULSION INTRAVENOUS at 10:03

## 2025-06-17 RX ADMIN — PROPOFOL 60 MG: 10 INJECTION, EMULSION INTRAVENOUS at 10:02

## 2025-06-17 RX ADMIN — PROPOFOL 180 MG: 10 INJECTION, EMULSION INTRAVENOUS at 10:01

## 2025-06-17 RX ADMIN — SODIUM CHLORIDE, POTASSIUM CHLORIDE, SODIUM LACTATE AND CALCIUM CHLORIDE 30 ML/HR: 600; 310; 30; 20 INJECTION, SOLUTION INTRAVENOUS at 08:17

## 2025-06-17 RX ADMIN — LIDOCAINE HYDROCHLORIDE 60 MG: 20 INJECTION, SOLUTION INFILTRATION; PERINEURAL at 10:01

## 2025-06-17 RX ADMIN — GLYCOPYRROLATE 0.2 MG: 0.2 INJECTION INTRAMUSCULAR; INTRAVENOUS at 10:01

## 2025-06-17 NOTE — DISCHARGE INSTRUCTIONS
For the next 24 hours patient needs to be with a responsible adult.    For THE REST OF TODAY DO NOT drive, operate machinery, appliances, drink alcohol, make important decisions or sign legal documents.    Start with a light or bland diet if you are feeling sick to your stomach otherwise advance to regular diet as tolerated.    Follow recommendations on procedure report if provided by your doctor.    Call Dr Romero for problems [phone]    Problems may include but not limited to: large amounts of bleeding, trouble breathing, repeated vomiting, severe unrelieved pain, fever or chills.      If biopsies or polyps were taken, MD will call you with the results in about 7 days. If you don't hear from the MD in 2 weeks, call the number above.

## 2025-06-17 NOTE — H&P
[facility]  Pre Procedure History & Physical    Chief Complaint:   Dyspepsia  Rectal bleeding    Subjective     HPI:   39 y.o. female here for EGD/colon for above issues    Past Medical History:   Past Medical History:   Diagnosis Date    Allergic     Anxiety     Depression     GERD (gastroesophageal reflux disease)     Heart murmur     Hiatal hernia     Hypertension 20 years ago    Initial patient encounter 2020    Irritable bowel syndrome     Normal pregnancy 2020    Scoliosis        Past Surgical History:  Past Surgical History:   Procedure Laterality Date     SECTION  2017 and 3-3-2021    COLONOSCOPY  approx     pt does not recall results     EXTRACORPOREAL SHOCKWAVE LITHOTRIPSY (ESWL), STENT INSERTION/REMOVAL Right 2025    Procedure: RIGHT EXTRACORPOREAL SHOCKWAVE LITHOTRIPSY WITH STENT INSERTION;  Surgeon: Bry Gillis MD;  Location: [facility];  Service: Urology;  Laterality: Right;    INTRAUTERINE DEVICE INSERTION  2025    Mirena    TONSILLECTOMY AND ADENOIDECTOMY      UPPER GASTROINTESTINAL ENDOSCOPY  approx     patient does not recall results     UVULECTOMY      WISDOM TOOTH EXTRACTION         Family History:  Family History   Problem Relation Age of Onset    Memory loss Mother     Hyperlipidemia Father     Diabetes type II Father     Hypertension Father     Irritable bowel syndrome Father     Early death Sister     Stroke Sister     Factor V Leiden deficiency Sister     Hypertension Brother     Hypertension Brother     Dementia Maternal Grandmother     Stroke Maternal Grandfather 70    Diabetes type I Nephew     Breast cancer Neg Hx     Colon cancer Neg Hx        Social History:   reports that she has never smoked. She has never used smokeless tobacco. She reports that she does not drink alcohol and does not use drugs.    Medications:   Medications Prior to Admission   Medication Sig Dispense Refill Last Dose/Taking     pantoprazole (PROTONIX) 40 MG EC tablet TAKE 1 TABLET BY MOUTH DAILY FOR GERD 30 tablet 0 6/16/2025    valsartan (DIOVAN) 80 MG tablet TAKE 1 TABLET BY MOUTH DAILY FOR HIGH BLOOD PRESSURE 90 tablet 3 6/16/2025    desvenlafaxine (PRISTIQ) 50 MG 24 hr tablet Take 1 tablet by mouth Daily. Indications: Major Depressive Disorder 90 tablet 3     hydrOXYzine (ATARAX) 25 MG tablet TAKE 1 TABLET BY MOUTH THREE TIMES DAILY AS NEEDED FOR ITCHING 90 tablet 3     nortriptyline (Pamelor) 10 MG capsule Take 2 capsules by mouth Every Night. Treatment for irritable bowel syndrome 180 capsule 3     sucralfate (CARAFATE) 1 g tablet TAKE 1 TABLET BY MOUTH FOUR TIMES DAILY BEFORE MEALS AND AT BEDTIME AS NEEDED FOR HEARTBURN OR GERD 90 tablet 0        Allergies:  Cephalexin, Hydrocodone, Oxycodone, and Penicillins    ROS:    Pertinent items are noted in HPI     Objective     Blood pressure (!) 168/107, pulse 95, resp. rate 16, SpO2 98%, not currently breastfeeding.    Physical Exam   Constitutional: Pt is oriented to person, place, and time and well-developed, well-nourished, and in no distress.   Mouth/Throat: Oropharynx is clear and moist.   Neck: Normal range of motion.   Cardiovascular: Normal rate, regular rhythm and normal heart sounds.    Pulmonary/Chest: Effort normal and breath sounds normal.   Abdominal: Soft. Nontender  Skin: Skin is warm and dry.   Psychiatric: Mood, memory, affect and judgment normal.     Assessment & Plan     Diagnosis:  Rectal bleeding  Dyspepsia    Anticipated Surgical Procedure:  EGD  Colonoscopy    The risks, benefits, and alternatives of this procedure have been discussed with the patient or the responsible party- the patient understands and agrees to proceed.

## 2025-06-17 NOTE — ANESTHESIA POSTPROCEDURE EVALUATION
Patient: Brittany Russo    Procedure Summary       Date: 06/17/25 Room / Location: [facility] ENDOSCOPY 10 / [facility] ENDOSCOPY    Anesthesia Start: 0958 Anesthesia Stop: 1027    Procedures:       COLONOSCOPY to cecum with cold forcep polypectomy      ESOPHAGOGASTRODUODENOSCOPY with cold forcep biopsies (Esophagus) Diagnosis:       Dyspepsia      Dysphagia, unspecified type      Rectal bleeding      Chronic diarrhea      (Dyspepsia [R10.13])      (Dysphagia, unspecified type [R13.10])      (Rectal bleeding [K62.5])      (Chronic diarrhea [K52.9])    Surgeons: Brandon Romero MD Provider: Leandro Fraser MD    Anesthesia Type: MAC ASA Status: 2            Anesthesia Type: MAC    Vitals  Vitals Value Taken Time   /95 06/17/25 10:50   Temp     Pulse 92 06/17/25 10:50   Resp 18 06/17/25 10:50   SpO2 96 % 06/17/25 10:50           Post Anesthesia Care and Evaluation    Patient location during evaluation: PACU  Patient participation: complete - patient participated  Level of consciousness: awake and alert  Pain management: adequate    Airway patency: patent  Anesthetic complications: No anesthetic complications    Cardiovascular status: acceptable  Respiratory status: acceptable  Hydration status: acceptable    Comments: --------------------            06/17/25               1050     --------------------   BP:       139/95     Pulse:      92       Resp:       18       SpO2:      96%      --------------------

## 2025-06-17 NOTE — ANESTHESIA PREPROCEDURE EVALUATION
Anesthesia Evaluation     Patient summary reviewed and Nursing notes reviewed                Airway   Mallampati: II  TM distance: <3 FB  Dental      Pulmonary - negative pulmonary ROS   Cardiovascular     ECG reviewed  Rhythm: regular  Rate: normal    (+) hypertension, valvular problems/murmurs      Neuro/Psych- negative ROS  GI/Hepatic/Renal/Endo    (+) morbid obesity, hiatal hernia, GERD, GI bleeding , renal disease- stones    Musculoskeletal (-) negative ROS    Abdominal    Substance History - negative use     OB/GYN negative ob/gyn ROS         Other                      Anesthesia Plan    ASA 2     MAC     intravenous induction     Anesthetic plan, risks, benefits, and alternatives have been provided, discussed and informed consent has been obtained with: patient and spouse/significant other.    CODE STATUS:

## 2025-06-19 LAB
CYTO UR: NORMAL
LAB AP CASE REPORT: NORMAL
PATH REPORT.FINAL DX SPEC: NORMAL
PATH REPORT.GROSS SPEC: NORMAL

## 2025-07-07 DIAGNOSIS — E66.813 CLASS 3 SEVERE OBESITY DUE TO EXCESS CALORIES WITH SERIOUS COMORBIDITY AND BODY MASS INDEX (BMI) OF 40.0 TO 44.9 IN ADULT: ICD-10-CM

## 2025-07-07 DIAGNOSIS — K21.9 GASTROESOPHAGEAL REFLUX DISEASE WITHOUT ESOPHAGITIS: ICD-10-CM

## 2025-07-08 RX ORDER — PANTOPRAZOLE SODIUM 40 MG/1
40 TABLET, DELAYED RELEASE ORAL DAILY
Qty: 30 TABLET | Refills: 0 | Status: SHIPPED | OUTPATIENT
Start: 2025-07-08

## 2025-07-22 DIAGNOSIS — K21.9 GASTROESOPHAGEAL REFLUX DISEASE WITHOUT ESOPHAGITIS: ICD-10-CM

## 2025-07-23 ENCOUNTER — PATIENT MESSAGE (OUTPATIENT)
Dept: FAMILY MEDICINE CLINIC | Facility: CLINIC | Age: 40
End: 2025-07-23
Payer: COMMERCIAL

## 2025-07-23 RX ORDER — LANSOPRAZOLE 30 MG/1
CAPSULE, DELAYED RELEASE ORAL
Qty: 90 CAPSULE | Refills: 1 | OUTPATIENT
Start: 2025-07-23

## 2025-07-23 RX ORDER — NORTRIPTYLINE HYDROCHLORIDE 25 MG/1
25 CAPSULE ORAL NIGHTLY
Qty: 90 CAPSULE | Refills: 3 | Status: SHIPPED | OUTPATIENT
Start: 2025-07-23

## 2025-07-24 RX ORDER — MECOBALAMIN 5000 MCG
15 TABLET,DISINTEGRATING ORAL DAILY
Qty: 90 CAPSULE | Refills: 3 | Status: SHIPPED | OUTPATIENT
Start: 2025-07-24

## 2025-08-04 ENCOUNTER — TELEPHONE (OUTPATIENT)
Dept: GASTROENTEROLOGY | Facility: CLINIC | Age: 40
End: 2025-08-04
Payer: COMMERCIAL

## 2025-08-05 DIAGNOSIS — K21.9 GASTROESOPHAGEAL REFLUX DISEASE WITHOUT ESOPHAGITIS: ICD-10-CM

## 2025-08-05 DIAGNOSIS — E66.813 CLASS 3 SEVERE OBESITY DUE TO EXCESS CALORIES WITH SERIOUS COMORBIDITY AND BODY MASS INDEX (BMI) OF 40.0 TO 44.9 IN ADULT: ICD-10-CM

## 2025-08-06 RX ORDER — PANTOPRAZOLE SODIUM 40 MG/1
40 TABLET, DELAYED RELEASE ORAL DAILY
Qty: 30 TABLET | Refills: 0 | OUTPATIENT
Start: 2025-08-06

## 2025-08-21 ENCOUNTER — PATIENT MESSAGE (OUTPATIENT)
Dept: FAMILY MEDICINE CLINIC | Facility: CLINIC | Age: 40
End: 2025-08-21
Payer: COMMERCIAL

## 2025-08-21 DIAGNOSIS — K21.9 GASTROESOPHAGEAL REFLUX DISEASE WITHOUT ESOPHAGITIS: Primary | ICD-10-CM

## (undated) DEVICE — NITINOL WIRE WITH HYDROPHILIC TIP: Brand: SENSOR

## (undated) DEVICE — ADAPT CLN BIOGUARD AIR/H2O DISP

## (undated) DEVICE — GLV SURG BIOGEL LTX PF 8

## (undated) DEVICE — CANN O2 ETCO2 FITS ALL CONN CO2 SMPL A/ 7IN DISP LF

## (undated) DEVICE — CATH URETRL FLXITP POLLACK STD 5F 70CM

## (undated) DEVICE — KT ORCA ORCAPOD DISP STRL

## (undated) DEVICE — TUBING, SUCTION, 1/4" X 10', STRAIGHT: Brand: MEDLINE

## (undated) DEVICE — BLCK/BITE BLOX W/DENTL/RIM W/STRAP 54F

## (undated) DEVICE — FRCP BX RADJAW4 NDL 2.8 240CM LG OG BX40

## (undated) DEVICE — SENSR O2 OXIMAX FNGR A/ 18IN NONSTR

## (undated) DEVICE — URETERAL STENT
Type: IMPLANTABLE DEVICE | Site: URETER | Status: NON-FUNCTIONAL
Brand: CONTOUR™
Removed: 2025-04-21

## (undated) DEVICE — LOU CYSTO: Brand: MEDLINE INDUSTRIES, INC.

## (undated) DEVICE — LN SMPL CO2 SHTRM SD STREAM W/M LUER